# Patient Record
Sex: FEMALE | Race: WHITE | NOT HISPANIC OR LATINO | Employment: FULL TIME | URBAN - METROPOLITAN AREA
[De-identification: names, ages, dates, MRNs, and addresses within clinical notes are randomized per-mention and may not be internally consistent; named-entity substitution may affect disease eponyms.]

---

## 2017-01-17 ENCOUNTER — TRANSFERRED RECORDS (OUTPATIENT)
Dept: HEALTH INFORMATION MANAGEMENT | Facility: CLINIC | Age: 52
End: 2017-01-17

## 2018-12-16 ENCOUNTER — HOSPITAL ENCOUNTER (EMERGENCY)
Facility: HOSPITAL | Age: 53
Discharge: HOME | End: 2018-12-16
Attending: PHYSICIAN ASSISTANT | Admitting: PHYSICIAN ASSISTANT
Payer: COMMERCIAL

## 2018-12-16 ENCOUNTER — APPOINTMENT (OUTPATIENT)
Dept: CT IMAGING | Facility: HOSPITAL | Age: 53
End: 2018-12-16
Attending: PHYSICIAN ASSISTANT
Payer: COMMERCIAL

## 2018-12-16 VITALS
DIASTOLIC BLOOD PRESSURE: 75 MMHG | HEIGHT: 66 IN | RESPIRATION RATE: 16 BRPM | TEMPERATURE: 98.2 F | SYSTOLIC BLOOD PRESSURE: 112 MMHG | OXYGEN SATURATION: 98 %

## 2018-12-16 DIAGNOSIS — N20.1 CALCULUS OF URETER: ICD-10-CM

## 2018-12-16 LAB
ALBUMIN SERPL-MCNC: 4 G/DL (ref 3.4–5)
ALBUMIN UR-MCNC: NEGATIVE MG/DL
ALP SERPL-CCNC: 44 U/L (ref 40–150)
ALT SERPL W P-5'-P-CCNC: 27 U/L (ref 0–50)
ANION GAP SERPL CALCULATED.3IONS-SCNC: 5 MMOL/L (ref 3–14)
APPEARANCE UR: CLEAR
AST SERPL W P-5'-P-CCNC: 11 U/L (ref 0–45)
BACTERIA #/AREA URNS HPF: ABNORMAL /HPF
BASOPHILS # BLD AUTO: 0 10E9/L (ref 0–0.2)
BASOPHILS NFR BLD AUTO: 0.3 %
BILIRUB SERPL-MCNC: 0.4 MG/DL (ref 0.2–1.3)
BILIRUB UR QL STRIP: NEGATIVE
BUN SERPL-MCNC: 14 MG/DL (ref 7–30)
CALCIUM SERPL-MCNC: 8.7 MG/DL (ref 8.5–10.1)
CHLORIDE SERPL-SCNC: 105 MMOL/L (ref 94–109)
CO2 SERPL-SCNC: 28 MMOL/L (ref 20–32)
COLOR UR AUTO: ABNORMAL
CREAT SERPL-MCNC: 0.61 MG/DL (ref 0.52–1.04)
CRP SERPL-MCNC: <2.9 MG/L (ref 0–8)
DIFFERENTIAL METHOD BLD: NORMAL
EOSINOPHIL # BLD AUTO: 0.1 10E9/L (ref 0–0.7)
EOSINOPHIL NFR BLD AUTO: 0.7 %
ERYTHROCYTE [DISTWIDTH] IN BLOOD BY AUTOMATED COUNT: 11.9 % (ref 10–15)
GFR SERPL CREATININE-BSD FRML MDRD: >90 ML/MIN/1.7M2
GLUCOSE SERPL-MCNC: 93 MG/DL (ref 70–99)
GLUCOSE UR STRIP-MCNC: NEGATIVE MG/DL
HCT VFR BLD AUTO: 41.8 % (ref 35–47)
HGB BLD-MCNC: 14.3 G/DL (ref 11.7–15.7)
HGB UR QL STRIP: ABNORMAL
IMM GRANULOCYTES # BLD: 0 10E9/L (ref 0–0.4)
IMM GRANULOCYTES NFR BLD: 0.3 %
KETONES UR STRIP-MCNC: NEGATIVE MG/DL
LEUKOCYTE ESTERASE UR QL STRIP: NEGATIVE
LYMPHOCYTES # BLD AUTO: 1.3 10E9/L (ref 0.8–5.3)
LYMPHOCYTES NFR BLD AUTO: 18.5 %
MCH RBC QN AUTO: 29.8 PG (ref 26.5–33)
MCHC RBC AUTO-ENTMCNC: 34.2 G/DL (ref 31.5–36.5)
MCV RBC AUTO: 87 FL (ref 78–100)
MONOCYTES # BLD AUTO: 0.4 10E9/L (ref 0–1.3)
MONOCYTES NFR BLD AUTO: 6 %
MUCOUS THREADS #/AREA URNS LPF: PRESENT /LPF
NEUTROPHILS # BLD AUTO: 5.1 10E9/L (ref 1.6–8.3)
NEUTROPHILS NFR BLD AUTO: 74.2 %
NITRATE UR QL: NEGATIVE
NRBC # BLD AUTO: 0 10*3/UL
NRBC BLD AUTO-RTO: 0 /100
PH UR STRIP: 7.5 PH (ref 4.7–8)
PLATELET # BLD AUTO: 286 10E9/L (ref 150–450)
POTASSIUM SERPL-SCNC: 4 MMOL/L (ref 3.4–5.3)
PROT SERPL-MCNC: 7.7 G/DL (ref 6.8–8.8)
RBC # BLD AUTO: 4.8 10E12/L (ref 3.8–5.2)
RBC #/AREA URNS AUTO: >182 /HPF (ref 0–2)
SODIUM SERPL-SCNC: 138 MMOL/L (ref 133–144)
SOURCE: ABNORMAL
SP GR UR STRIP: 1 (ref 1–1.03)
UROBILINOGEN UR STRIP-MCNC: NORMAL MG/DL (ref 0–2)
WBC # BLD AUTO: 6.9 10E9/L (ref 4–11)
WBC #/AREA URNS AUTO: 2 /HPF (ref 0–5)

## 2018-12-16 PROCEDURE — 96375 TX/PRO/DX INJ NEW DRUG ADDON: CPT

## 2018-12-16 PROCEDURE — 99284 EMERGENCY DEPT VISIT MOD MDM: CPT | Performed by: PHYSICIAN ASSISTANT

## 2018-12-16 PROCEDURE — 85025 COMPLETE CBC W/AUTO DIFF WBC: CPT | Performed by: PHYSICIAN ASSISTANT

## 2018-12-16 PROCEDURE — 80053 COMPREHEN METABOLIC PANEL: CPT | Performed by: PHYSICIAN ASSISTANT

## 2018-12-16 PROCEDURE — 86140 C-REACTIVE PROTEIN: CPT | Performed by: PHYSICIAN ASSISTANT

## 2018-12-16 PROCEDURE — 36415 COLL VENOUS BLD VENIPUNCTURE: CPT | Performed by: PHYSICIAN ASSISTANT

## 2018-12-16 PROCEDURE — 74177 CT ABD & PELVIS W/CONTRAST: CPT | Mod: TC

## 2018-12-16 PROCEDURE — 25000128 H RX IP 250 OP 636: Performed by: PHYSICIAN ASSISTANT

## 2018-12-16 PROCEDURE — 96374 THER/PROPH/DIAG INJ IV PUSH: CPT | Mod: XU

## 2018-12-16 PROCEDURE — 99285 EMERGENCY DEPT VISIT HI MDM: CPT | Mod: 25

## 2018-12-16 PROCEDURE — 81001 URINALYSIS AUTO W/SCOPE: CPT | Performed by: PHYSICIAN ASSISTANT

## 2018-12-16 PROCEDURE — 25500064 ZZH RX 255 OP 636: Performed by: PHYSICIAN ASSISTANT

## 2018-12-16 RX ORDER — DIPHENHYDRAMINE HYDROCHLORIDE 50 MG/ML
50 INJECTION INTRAMUSCULAR; INTRAVENOUS ONCE
Status: COMPLETED | OUTPATIENT
Start: 2018-12-16 | End: 2018-12-16

## 2018-12-16 RX ORDER — LEVOTHYROXINE SODIUM 112 UG/1
112 TABLET ORAL DAILY
COMMUNITY
End: 2023-12-20

## 2018-12-16 RX ORDER — IOPAMIDOL 612 MG/ML
100 INJECTION, SOLUTION INTRAVASCULAR ONCE
Status: COMPLETED | OUTPATIENT
Start: 2018-12-16 | End: 2018-12-16

## 2018-12-16 RX ORDER — CHLORAL HYDRATE 500 MG
2 CAPSULE ORAL DAILY
COMMUNITY
End: 2023-02-21

## 2018-12-16 RX ORDER — MORPHINE SULFATE 2 MG/ML
2 INJECTION, SOLUTION INTRAMUSCULAR; INTRAVENOUS ONCE
Status: COMPLETED | OUTPATIENT
Start: 2018-12-16 | End: 2018-12-16

## 2018-12-16 RX ORDER — ONDANSETRON 2 MG/ML
4 INJECTION INTRAMUSCULAR; INTRAVENOUS ONCE
Status: COMPLETED | OUTPATIENT
Start: 2018-12-16 | End: 2018-12-16

## 2018-12-16 RX ORDER — METHYLPREDNISOLONE SODIUM SUCCINATE 125 MG/2ML
125 INJECTION, POWDER, LYOPHILIZED, FOR SOLUTION INTRAMUSCULAR; INTRAVENOUS ONCE
Status: COMPLETED | OUTPATIENT
Start: 2018-12-16 | End: 2018-12-16

## 2018-12-16 RX ADMIN — ONDANSETRON 4 MG: 2 INJECTION INTRAMUSCULAR; INTRAVENOUS at 12:48

## 2018-12-16 RX ADMIN — METHYLPREDNISOLONE SODIUM SUCCINATE 125 MG: 125 INJECTION, POWDER, FOR SOLUTION INTRAMUSCULAR; INTRAVENOUS at 13:13

## 2018-12-16 RX ADMIN — DIPHENHYDRAMINE HYDROCHLORIDE 50 MG: 50 INJECTION, SOLUTION INTRAMUSCULAR; INTRAVENOUS at 13:57

## 2018-12-16 RX ADMIN — MORPHINE SULFATE 2 MG: 2 INJECTION, SOLUTION INTRAMUSCULAR; INTRAVENOUS at 12:49

## 2018-12-16 RX ADMIN — IOPAMIDOL 100 ML: 612 INJECTION, SOLUTION INTRAVENOUS at 14:07

## 2018-12-16 RX ADMIN — SODIUM CHLORIDE 1000 ML: 9 INJECTION, SOLUTION INTRAVENOUS at 12:47

## 2018-12-16 ASSESSMENT — ENCOUNTER SYMPTOMS
ANAL BLEEDING: 0
VOMITING: 0
NEUROLOGICAL NEGATIVE: 1
HEMATURIA: 0
FREQUENCY: 0
FLANK PAIN: 0
BLOOD IN STOOL: 0
SHORTNESS OF BREATH: 0
UNEXPECTED WEIGHT CHANGE: 0
DIARRHEA: 0
ABDOMINAL DISTENTION: 0
DYSURIA: 0
CHILLS: 0
CONSTIPATION: 0
FEVER: 0
ABDOMINAL PAIN: 1
SORE THROAT: 0
APPETITE CHANGE: 1
MUSCULOSKELETAL NEGATIVE: 1
DIFFICULTY URINATING: 0
NAUSEA: 0

## 2018-12-16 NOTE — ED NOTES
Last meal last night 2000, water sip 1100. Cramping abd pain since Wednesday even after BM. Pain moved more to RLQ at about 0300, good BM today with no change in pain, denies dyuria. Very slight nausea no vomitting.

## 2018-12-16 NOTE — ED TRIAGE NOTES
Pt comes into the ER with right lower abdominal pain that kept her up last night. Lower abdominal pain with cramping since Wednesday. Multiple BM. No vomiting, nausea.   Chills.

## 2018-12-16 NOTE — ED AVS SNAPSHOT
[facility]  [address]  [place] [address]  Phone:  [phone]                                    Gisel Jameson   MRN: [id number]    Department:  [facility]   Date of Visit:  12/16/2018           After Visit Summary Signature Page    I have received my discharge instructions, and my questions have been answered. I have discussed any challenges I see with this plan with the nurse or doctor.    ..........................................................................................................................................  Patient/Patient Representative Signature      ..........................................................................................................................................  Patient Representative Print Name and Relationship to Patient    ..................................................               ................................................  Date                                   Time    ..........................................................................................................................................  Reviewed by Signature/Title    ...................................................              ..............................................  Date                                               Time          22EPIC Rev 08/18

## 2018-12-16 NOTE — DISCHARGE INSTRUCTIONS
Increase fluids. Take Ibuprofen 800mg every 6-8 hours for pain. Return here with any new or worsening symptoms.     What to expect when you have contrast    During your exam, we will inject ?contrast? into your vein or artery. (Contrast is a clear liquid with iodine in it. It shows up on X-rays.)    You may feel warm or hot. You may have a metal taste in your mouth and a slight upset stomach. You may also feel pressure near the kidneys and bladder. These effects will last about 1 to 3 minutes.    Please tell us if you have:   Sneezing    Itching   Hives    Swelling in the face   A hoarse voice   Breathing problems   Other new symptoms    Serious problems are rare.  They may include:   Irregular heartbeat    Seizures   Kidney failure             Tissue damage   Shock     Death    If you have any problems during the exam, we  will treat them right away.    When you get home    Call your hospital if you have any new symptoms in the next 2 days, like hives or swelling. (Phone numbers are at the bottom of this page.) Or call your family doctor.     If you have wheezing or trouble breathing, call 911.    Self-care  -Drink at least 4 extra glasses of water today.   This reduces the stress on your kidneys.  -Keep taking your regular medicines.    The contrast will pass out of your body in your  Urine(pee). This will happen in the next 24 hours. You  will not feel this. Your urine will not  change color.    If you have kidney problems or take metformin    Drink 4 to 8 large glasses of water for the next  2 days, if you are not on a fluid restriction.    ?If you take metformin (Glucophage or Glucovance) for diabetes, keep taking it.      ?Your kidney function tests are abnormal.  If you take Metformin, do not take it for 48 hours. Please go to your clinic for a blood test within 3 days after your exam before the restarting this medicine.     (Note to provider:please give patient prescription for lab tests.)    ?Special  instructions: 4 to 8 extra glasses of water for the next 2 days    I have read and understand the above information.    Patient Sign Here:______________________________________Date:________Time:______    Staff Sign Here:________________________________________Date:_______Time:______      Radiology Departments:     ?[facility]: [phone] ?[facility]: [phone]     ?[facility]: [phone] ?[facility]:[phone]      ?[facility]: [phone]  ?[facility]: [phone]  ?[place]:[phone]    ?[facility]:[phone]  ?[facility]:[phone]

## 2018-12-17 NOTE — ED PROVIDER NOTES
"  History     Chief Complaint   Patient presents with     Abdominal Pain     HPI  Gisel Jameson is a 53 year old female who presents with RLQ abdominal pain intermittently x 4 days, worse since this am. Slightly decreased appetite today. Denies fevers/chills or n/v/d.     Problem List:    There are no active problems to display for this patient.       Past Medical History:    No past medical history on file.    Past Surgical History:    No past surgical history on file.    Family History:    No family history on file.    Social History:  Marital Status:   [2]  Social History     Tobacco Use     Smoking status: Not on file   Substance Use Topics     Alcohol use: Not on file     Drug use: Not on file        Medications:      fish oil-omega-3 fatty acids 1000 MG capsule   levothyroxine (SYNTHROID/LEVOTHROID) 112 MCG tablet   metFORMIN (GLUCOPHAGE) 500 MG tablet         Review of Systems   Constitutional: Positive for appetite change. Negative for chills, fever and unexpected weight change.   HENT: Negative for sore throat.    Respiratory: Negative for shortness of breath.    Cardiovascular: Negative for chest pain.   Gastrointestinal: Positive for abdominal pain. Negative for abdominal distention, anal bleeding, blood in stool, constipation, diarrhea, nausea and vomiting.   Genitourinary: Negative.  Negative for difficulty urinating, dyspareunia, dysuria, flank pain, frequency, genital sores, hematuria, pelvic pain, urgency, vaginal bleeding, vaginal discharge and vaginal pain.   Musculoskeletal: Negative.    Skin: Negative.    Neurological: Negative.    All other systems reviewed and are negative.      Physical Exam   BP: 128/81  Heart Rate: 69  Temp: 97.9  F (36.6  C)  Resp: 14  Height: 167.6 cm (5' 6\")  SpO2: 100 %      Physical Exam   Constitutional: She is oriented to person, place, and time. Vital signs are normal. She appears well-developed and well-nourished.  Non-toxic appearance. She does not have a " sickly appearance. She does not appear ill. No distress.   HENT:   Head: Normocephalic and atraumatic.   Right Ear: External ear normal.   Left Ear: External ear normal.   Nose: Nose normal.   Mouth/Throat: Oropharynx is clear and moist. No oropharyngeal exudate.   Eyes: Conjunctivae are normal. Pupils are equal, round, and reactive to light. Right eye exhibits no discharge. Left eye exhibits no discharge. No scleral icterus.   Neck: Normal range of motion. Neck supple.   Cardiovascular: Normal rate, regular rhythm, normal heart sounds and intact distal pulses. Exam reveals no gallop and no friction rub.   No murmur heard.  Pulmonary/Chest: Effort normal and breath sounds normal. No respiratory distress. She has no wheezes. She has no rales. She exhibits no tenderness.   Abdominal: Soft. Bowel sounds are normal. She exhibits no distension and no mass. There is tenderness in the right lower quadrant. There is no rebound and no guarding.   Musculoskeletal: Normal range of motion. She exhibits no edema.   Lymphadenopathy:     She has no cervical adenopathy.   Neurological: She is alert and oriented to person, place, and time. No cranial nerve deficit.   Skin: Skin is warm and dry. No rash noted. She is not diaphoretic. No erythema. No pallor.   Psychiatric: She has a normal mood and affect. Her behavior is normal. Judgment and thought content normal.   Nursing note and vitals reviewed.      ED Course        Procedures               Results for orders placed or performed during the hospital encounter of 12/16/18 (from the past 24 hour(s))   CBC with platelets differential   Result Value Ref [facility]    WBC 6.9 4.0 - 11.0 10e9/L    RBC Count 4.80 3.8 - 5.2 10e12/L    Hemoglobin 14.3 11.7 - 15.7 g/dL    Hematocrit 41.8 35.0 - 47.0 %    MCV 87 78 - 100 fl    MCH 29.8 26.5 - 33.0 pg    MCHC 34.2 31.5 - 36.5 g/dL    RDW 11.9 10.0 - 15.0 %    Platelet Count 286 150 - 450 10e9/L    Diff Method Automated Method     % Neutrophils  74.2 %    % Lymphocytes 18.5 %    % Monocytes 6.0 %    % Eosinophils 0.7 %    % Basophils 0.3 %    % Immature Granulocytes 0.3 %    Nucleated RBCs 0 0 /100    Absolute Neutrophil 5.1 1.6 - 8.3 10e9/L    Absolute Lymphocytes 1.3 0.8 - 5.3 10e9/L    Absolute Monocytes 0.4 0.0 - 1.3 10e9/L    Absolute Eosinophils 0.1 0.0 - 0.7 10e9/L    Absolute Basophils 0.0 0.0 - 0.2 10e9/L    Abs Immature Granulocytes 0.0 0 - 0.4 10e9/L    Absolute Nucleated RBC 0.0    Comprehensive metabolic panel   Result Value Ref [facility]    Sodium 138 133 - 144 mmol/L    Potassium 4.0 3.4 - 5.3 mmol/L    Chloride 105 94 - 109 mmol/L    Carbon Dioxide 28 20 - 32 mmol/L    Anion Gap 5 3 - 14 mmol/L    Glucose 93 70 - 99 mg/dL    Urea Nitrogen 14 7 - 30 mg/dL    Creatinine 0.61 0.52 - 1.04 mg/dL    GFR Estimate >90 >60 mL/min/1.7m2    GFR Estimate If Black >90 >60 mL/min/1.7m2    Calcium 8.7 8.5 - 10.1 mg/dL    Bilirubin Total 0.4 0.2 - 1.3 mg/dL    Albumin 4.0 3.4 - 5.0 g/dL    Protein Total 7.7 6.8 - 8.8 g/dL    Alkaline Phosphatase 44 40 - 150 U/L    ALT 27 0 - 50 U/L    AST 11 0 - 45 U/L   CRP inflammation   Result Value Ref [facility]    CRP Inflammation <2.9 0.0 - 8.0 mg/L   CT Abdomen Pelvis w Contrast    Narrative    PROCEDURE: CT ABDOMEN PELVIS W CONTRAST 12/16/2018 2:25 PM    HISTORY: Abd pain, diverticulitis suspected; RLQ abdominal pain,  appendicitis vs diverticulitis    COMPARISONS: None.    Meds/Dose Given: ISOVUE 300  100ML    TECHNIQUE: Axial postcontrast enhanced images with coronal and  sagittal reformatted images.    FINDINGS: Lung bases are clear.    No focal abnormality is seen in the liver, spleen, adrenal glands,  pancreas or in the right kidney.    There is a 2.7 x 1.6 cm fat-containing mass in the lateral aspect of  the left kidney. There is mild dilatation of the right renal pelvis.  There is a 6 mm stone in the right renal pelvis. No ureteral stone is  seen on the left.    There is no inflammatory change. No bowel distention is  seen. There is  no pelvic mass or fluid collection.    Bone windows show no fracture. There is no aneurysm of the abdominal  aorta. There is no bone lesion.    Multiple gallstones are seen.    Findings are concordant with the original virtual radiology result.         Impression    IMPRESSION:   1. Gallstones.  2. Fat-containing mass in the lateral left kidney, probably an  angiomyolipoma.  3. Mildly prominent right renal pelvis with 6 mm stone in the renal  pelvis. No ureteral stone is seen.    MICHELLE KELLY MD   UA reflex to Microscopic and Culture   Result Value Ref [facility]    Color Urine Light Yellow     Appearance Urine Clear     Glucose Urine Negative NEG^Negative mg/dL    Bilirubin Urine Negative NEG^Negative    Ketones Urine Negative NEG^Negative mg/dL    Specific Gravity Urine 1.005 1.003 - 1.035    Blood Urine Large (A) NEG^Negative    pH Urine 7.5 4.7 - 8.0 pH    Protein Albumin Urine Negative NEG^Negative mg/dL    Urobilinogen mg/dL Normal 0.0 - 2.0 mg/dL    Nitrite Urine Negative NEG^Negative    Leukocyte Esterase Urine Negative NEG^Negative    Source Midstream Urine     RBC Urine >182 (H) 0 - 2 /HPF    WBC Urine 2 0 - 5 /HPF    Bacteria Urine None (A) NEG^Negative /HPF    Mucous Urine Present (A) NEG^Negative /LPF       Medications   morphine (PF) injection 2 mg (2 mg Intravenous Given 12/16/18 1249)   ondansetron (ZOFRAN) injection 4 mg (4 mg Intravenous Given 12/16/18 1248)   0.9% sodium chloride BOLUS (0 mLs Intravenous Stopped 12/16/18 1533)   methylPREDNISolone sodium succinate (solu-MEDROL) injection 125 mg (125 mg Intravenous Given 12/16/18 1313)   diphenhydrAMINE (BENADRYL) injection 50 mg (50 mg Intravenous Given 12/16/18 1357)   iopamidol (ISOVUE-300) IV solution 61% 100 mL (100 mLs Intravenous Given 12/16/18 1407)   sodium chloride (PF) 0.9% PF flush 60 mL (60 mLs Intravenous Given 12/16/18 1406)       Assessments & Plan (with Medical Decision Making)   Findings consistent with passed  right ureteral stone per CT abd/pelvis. UA positive for RBCs, negative for infection. Pt is pain free. She was discharged home following.       I have reviewed the nursing notes.    I have reviewed the findings, diagnosis, plan and need for follow up with the patient.       Medication List      There are no discharge medications for this visit.         Final diagnoses:   Calculus of ureter - passed       12/16/2018   [facility]     Eliana Sin PA-C  12/16/18 [id number]

## 2019-09-20 ENCOUNTER — TRANSFERRED RECORDS (OUTPATIENT)
Dept: HEALTH INFORMATION MANAGEMENT | Facility: CLINIC | Age: 54
End: 2019-09-20

## 2021-08-12 LAB
ALT SERPL-CCNC: 31 U/L (ref 18–65)
AST SERPL-CCNC: 22 U/L (ref 10–30)
CHOLESTEROL (EXTERNAL): 271 MG/DL
CREATININE (EXTERNAL): 0.73 MG/DL (ref 0.7–1.2)
GFR ESTIMATED (EXTERNAL): >60 ML/MIN/1.73M2
GLUCOSE (EXTERNAL): 93 MG/DL (ref 60–99)
HDLC SERPL-MCNC: 52 MG/DL
LDL CHOLESTEROL CALCULATED (EXTERNAL): 182 MG/DL
POTASSIUM (EXTERNAL): 4.2 MMOL/L (ref 3.5–5.1)
TRIGLYCERIDES (EXTERNAL): 186 MG/DL
TSH SERPL-ACNC: 1.18 MIU/ML (ref 0.35–4.8)

## 2022-01-23 ENCOUNTER — TRANSFERRED RECORDS (OUTPATIENT)
Dept: HEALTH INFORMATION MANAGEMENT | Facility: CLINIC | Age: 57
End: 2022-01-23

## 2022-06-02 ENCOUNTER — TRANSFERRED RECORDS (OUTPATIENT)
Dept: HEALTH INFORMATION MANAGEMENT | Facility: CLINIC | Age: 57
End: 2022-06-02

## 2022-06-02 LAB
ALT SERPL-CCNC: 25 U/L (ref 18–65)
AST SERPL-CCNC: 21 U/L (ref 10–30)
CHOLESTEROL (EXTERNAL): 184 MG/DL
CREATININE (EXTERNAL): 0.6 MG/DL (ref 0.7–1.2)
GFR ESTIMATED (EXTERNAL): >60 ML/MIN/1.73M2
GLUCOSE (EXTERNAL): 80 MG/DL (ref 60–99)
HBA1C MFR BLD: 5.6 %
HDLC SERPL-MCNC: 41 MG/DL
LDL CHOLESTEROL CALCULATED (EXTERNAL): 105 MG/DL
PHQ9 SCORE: 2
POTASSIUM (EXTERNAL): 4.5 MMOL/L (ref 3.5–5.1)
TRIGLYCERIDES (EXTERNAL): 188 MG/DL
TSH SERPL-ACNC: 0.14 UIU/ML (ref 0.35–4.8)

## 2022-10-21 ENCOUNTER — TRANSFERRED RECORDS (OUTPATIENT)
Dept: HEALTH INFORMATION MANAGEMENT | Facility: CLINIC | Age: 57
End: 2022-10-21

## 2023-02-08 ENCOUNTER — TRANSFERRED RECORDS (OUTPATIENT)
Dept: HEALTH INFORMATION MANAGEMENT | Facility: CLINIC | Age: 58
End: 2023-02-08

## 2023-02-20 NOTE — PROGRESS NOTES
Assessment & Plan   Problem List Items Addressed This Visit    None  Visit Diagnoses     Routine history and physical examination of adult    -  Primary    Relevant Orders    CBC with platelets and differential (Completed)    Comprehensive metabolic panel (BMP + Alb, Alk Phos, ALT, AST, Total. Bili, TP)    Vitamin D deficiency        Relevant Orders    Vitamin D Deficiency    Hyperlipidemia LDL goal <100        Relevant Medications    rosuvastatin (CRESTOR) 10 MG tablet    Other Relevant Orders    Lipid Profile (Chol, Trig, HDL, LDL calc)    Acquired hypothyroidism        Relevant Orders    TSH    T4, free    Metabolic syndrome X        Relevant Orders    Hemoglobin A1c             40 minutes spent on the date of the encounter doing chart review, review of test results, interpretation of tests, patient visit and documentation            No follow-ups on file.    [facility], [facility]    Jordan Batres is a 57 year old, presenting for the following health issues:  Establish Care      NOHEMI Batres is a 57 year old female with a history of hypothyroidism, metabolic syndrome and hyperlipidemia who presents today to establish care.  She has no specific complaints today but does report ongoing constipation with her Rybelsus and states it has not worked for weight loss, uncertain if it benefits her non alcoholic fatty liver disease.    Previous PCP- Dr.Mark Cerda     Metabolic syndrome       How often are you checking your blood sugar? Not at all    What concerns do you have today about your diabetes? None and Other: discuss rybelsus     Do you have any of these symptoms? (Select all that apply)  No numbness or tingling in feet.  No redness, sores or blisters on feet.  No complaints of excessive thirst.  No reports of blurry vision.  No significant changes to weight.     Medication:    rybelsus 14 mg daily         BP Readings from Last 2 Encounters:   02/21/23 113/74    12/16/18 112/75     No results found for: A1C, LDL      Hypothyroidism       Since last visit, patient describes the following symptoms: Weight stable, no hair loss, no skin changes, no constipation, no loose stools and constipation        Review of Systems   Constitutional, HEENT, cardiovascular, pulmonary, GI, , musculoskeletal, neuro, skin, endocrine and psych systems are negative, except as otherwise noted.      Objective    /74 (BP Location: Left arm, Patient Position: Sitting, Cuff Size: Adult Regular)   Pulse 70   Temp 97.8  F (36.6  C) (Tympanic)   Resp 20   Wt 72.8 kg (160 lb 9.6 oz)   SpO2 98%   BMI 25.92 kg/m    Body mass index is 25.92 kg/m .  Physical Exam   GENERAL: healthy, alert and no distress  EYES: Eyes grossly normal to inspection, PERRL and conjunctivae and sclerae normal  HENT: ear canals and TM's normal, nose and mouth without ulcers or lesions  NECK: no adenopathy, no asymmetry, masses, or scars and thyroid normal to palpation  RESP: lungs clear to auscultation - no rales, rhonchi or wheezes  BREAST: normal without masses, tenderness or nipple discharge and no palpable axillary masses or adenopathy  CV: regular rate and rhythm, normal S1 S2, no S3 or S4, no murmur, click or rub, no peripheral edema and peripheral pulses strong  ABDOMEN: soft, nontender, no hepatosplenomegaly, no masses and bowel sounds normal  MS: no gross musculoskeletal defects noted, no edema  SKIN: no suspicious lesions or rashes  NEURO: Normal strength and tone, mentation intact and speech normal  PSYCH: mentation appears normal, affect normal/bright    Admission on 12/16/2018, Discharged on 12/16/2018   Component Date Value Ref [place] Status     WBC 12/16/2018 6.9  4.0 - 11.0 10e9/L Final     RBC Count 12/16/2018 4.80  3.8 - 5.2 10e12/L Final     Hemoglobin 12/16/2018 14.3  11.7 - 15.7 g/dL Final     Hematocrit 12/16/2018 41.8  35.0 - 47.0 % Final     MCV 12/16/2018 87  78 - 100 fl Final     MCH  12/16/2018 29.8  26.5 - 33.0 pg Final     MCHC 12/16/2018 34.2  31.5 - 36.5 g/dL Final     RDW 12/16/2018 11.9  10.0 - 15.0 % Final     Platelet Count 12/16/2018 286  150 - 450 10e9/L Final     Diff Method 12/16/2018 Automated Method   Final     % Neutrophils 12/16/2018 74.2  % Final     % Lymphocytes 12/16/2018 18.5  % Final     % Monocytes 12/16/2018 6.0  % Final     % Eosinophils 12/16/2018 0.7  % Final     % Basophils 12/16/2018 0.3  % Final     % Immature Granulocytes 12/16/2018 0.3  % Final     Nucleated RBCs 12/16/2018 0  0 /100 Final     Absolute Neutrophil 12/16/2018 5.1  1.6 - 8.3 10e9/L Final     Absolute Lymphocytes 12/16/2018 1.3  0.8 - 5.3 10e9/L Final     Absolute Monocytes 12/16/2018 0.4  0.0 - 1.3 10e9/L Final     Absolute Eosinophils 12/16/2018 0.1  0.0 - 0.7 10e9/L Final     Absolute Basophils 12/16/2018 0.0  0.0 - 0.2 10e9/L Final     Abs Immature Granulocytes 12/16/2018 0.0  0 - 0.4 10e9/L Final     Absolute Nucleated RBC 12/16/2018 0.0   Final     Sodium 12/16/2018 138  133 - 144 mmol/L Final     Potassium 12/16/2018 4.0  3.4 - 5.3 mmol/L Final     Chloride 12/16/2018 105  94 - 109 mmol/L Final     Carbon Dioxide 12/16/2018 28  20 - 32 mmol/L Final     Anion Gap 12/16/2018 5  3 - 14 mmol/L Final     Glucose 12/16/2018 93  70 - 99 mg/dL Final     Urea Nitrogen 12/16/2018 14  7 - 30 mg/dL Final     Creatinine 12/16/2018 0.61  0.52 - 1.04 mg/dL Final     GFR Estimate 12/16/2018 >90  >60 mL/min/1.7m2 Final    Non  GFR Calc     GFR Estimate If Black 12/16/2018 >90  >60 mL/min/1.7m2 Final    African American GFR Calc     Calcium 12/16/2018 8.7  8.5 - 10.1 mg/dL Final     Bilirubin Total 12/16/2018 0.4  0.2 - 1.3 mg/dL Final     Albumin 12/16/2018 4.0  3.4 - 5.0 g/dL Final     Protein Total 12/16/2018 7.7  6.8 - 8.8 g/dL Final     Alkaline Phosphatase 12/16/2018 44  40 - 150 U/L Final     ALT 12/16/2018 27  0 - 50 U/L Final     AST 12/16/2018 11  0 - 45 U/L Final     CRP Inflammation  12/16/2018 <2.9  0.0 - 8.0 mg/L Final     Color Urine 12/16/2018 Light Yellow   Final     Appearance Urine 12/16/2018 Clear   Final     Glucose Urine 12/16/2018 Negative  NEG^Negative mg/dL Final     Bilirubin Urine 12/16/2018 Negative  NEG^Negative Final     Ketones Urine 12/16/2018 Negative  NEG^Negative mg/dL Final     Specific Gravity Urine 12/16/2018 1.005  1.003 - 1.035 Final     Blood Urine 12/16/2018 Large (A)  NEG^Negative Final     pH Urine 12/16/2018 7.5  4.7 - 8.0 pH Final     Protein Albumin Urine 12/16/2018 Negative  NEG^Negative mg/dL Final     Urobilinogen mg/dL 12/16/2018 Normal  0.0 - 2.0 mg/dL Final     Nitrite Urine 12/16/2018 Negative  NEG^Negative Final     Leukocyte Esterase Urine 12/16/2018 Negative  NEG^Negative Final     Source 12/16/2018 Midstream Urine   Final     RBC Urine 12/16/2018 >182 (H)  0 - 2 /HPF Final     WBC Urine 12/16/2018 2  0 - 5 /HPF Final     Bacteria Urine 12/16/2018 None (A)  NEG^Negative /HPF Final     Mucous Urine 12/16/2018 Present (A)  NEG^Negative /LPF Final     Results for orders placed or performed in visit on 02/21/23   CBC with platelets and differential     Status: None   Result Value Ref [facility]    WBC Count 6.0 4.0 - 11.0 10e3/uL    RBC Count 4.48 3.80 - 5.20 10e6/uL    Hemoglobin 13.1 11.7 - 15.7 g/dL    Hematocrit 39.8 35.0 - 47.0 %    MCV 89 78 - 100 fL    MCH 29.2 26.5 - 33.0 pg    MCHC 32.9 31.5 - 36.5 g/dL    RDW 12.4 10.0 - 15.0 %    Platelet Count 322 150 - 450 10e3/uL    % Neutrophils 65 %    % Lymphocytes 26 %    % Monocytes 8 %    % Eosinophils 1 %    % Basophils 0 %    Absolute Neutrophils 3.9 1.6 - 8.3 10e3/uL    Absolute Lymphocytes 1.6 0.8 - 5.3 10e3/uL    Absolute Monocytes 0.5 0.0 - 1.3 10e3/uL    Absolute Eosinophils 0.1 0.0 - 0.7 10e3/uL    Absolute Basophils 0.0 0.0 - 0.2 10e3/uL   CBC with platelets and differential     Status: None    Narrative    The following orders were created for panel order CBC with platelets and  differential.  Procedure                               Abnormality         Status                     ---------                               -----------         ------                     CBC with platelets and d...[[id number]]                      Final result                 Please view results for these tests on the individual orders.     Results for orders placed or performed in visit on 02/21/23 (from the past 24 hour(s))   CBC with platelets and differential    Narrative    The following orders were created for panel order CBC with platelets and differential.  Procedure                               Abnormality         Status                     ---------                               -----------         ------                     CBC with platelets and d...[[id number]]                      Final result                 Please view results for these tests on the individual orders.   CBC with platelets and differential   Result Value Ref [facility]    WBC Count 6.0 4.0 - 11.0 10e3/uL    RBC Count 4.48 3.80 - 5.20 10e6/uL    Hemoglobin 13.1 11.7 - 15.7 g/dL    Hematocrit 39.8 35.0 - 47.0 %    MCV 89 78 - 100 fL    MCH 29.2 26.5 - 33.0 pg    MCHC 32.9 31.5 - 36.5 g/dL    RDW 12.4 10.0 - 15.0 %    Platelet Count 322 150 - 450 10e3/uL    % Neutrophils 65 %    % Lymphocytes 26 %    % Monocytes 8 %    % Eosinophils 1 %    % Basophils 0 %    Absolute Neutrophils 3.9 1.6 - 8.3 10e3/uL    Absolute Lymphocytes 1.6 0.8 - 5.3 10e3/uL    Absolute Monocytes 0.5 0.0 - 1.3 10e3/uL    Absolute Eosinophils 0.1 0.0 - 0.7 10e3/uL    Absolute Basophils 0.0 0.0 - 0.2 10e3/uL

## 2023-02-21 ENCOUNTER — OFFICE VISIT (OUTPATIENT)
Dept: INTERNAL MEDICINE | Facility: OTHER | Age: 58
End: 2023-02-21
Attending: INTERNAL MEDICINE
Payer: COMMERCIAL

## 2023-02-21 VITALS
RESPIRATION RATE: 20 BRPM | OXYGEN SATURATION: 98 % | DIASTOLIC BLOOD PRESSURE: 74 MMHG | WEIGHT: 160.6 LBS | HEART RATE: 70 BPM | SYSTOLIC BLOOD PRESSURE: 113 MMHG | BODY MASS INDEX: 25.92 KG/M2 | TEMPERATURE: 97.8 F

## 2023-02-21 DIAGNOSIS — E55.9 VITAMIN D DEFICIENCY: ICD-10-CM

## 2023-02-21 DIAGNOSIS — E88.810 METABOLIC SYNDROME X: ICD-10-CM

## 2023-02-21 DIAGNOSIS — E78.5 HYPERLIPIDEMIA LDL GOAL <100: ICD-10-CM

## 2023-02-21 DIAGNOSIS — Z00.00 ROUTINE HISTORY AND PHYSICAL EXAMINATION OF ADULT: Primary | ICD-10-CM

## 2023-02-21 DIAGNOSIS — E03.9 ACQUIRED HYPOTHYROIDISM: ICD-10-CM

## 2023-02-21 LAB
ALBUMIN SERPL BCG-MCNC: 4.4 G/DL (ref 3.5–5.2)
ALP SERPL-CCNC: 46 U/L (ref 35–104)
ALT SERPL W P-5'-P-CCNC: 38 U/L (ref 10–35)
ANION GAP SERPL CALCULATED.3IONS-SCNC: 11 MMOL/L (ref 7–15)
AST SERPL W P-5'-P-CCNC: 23 U/L (ref 10–35)
BASOPHILS # BLD AUTO: 0 10E3/UL (ref 0–0.2)
BASOPHILS NFR BLD AUTO: 0 %
BILIRUB SERPL-MCNC: 0.3 MG/DL
BUN SERPL-MCNC: 21.2 MG/DL (ref 6–20)
CALCIUM SERPL-MCNC: 9.9 MG/DL (ref 8.6–10)
CHLORIDE SERPL-SCNC: 103 MMOL/L (ref 98–107)
CHOLEST SERPL-MCNC: 166 MG/DL
CREAT SERPL-MCNC: 0.68 MG/DL (ref 0.51–0.95)
DEPRECATED HCO3 PLAS-SCNC: 26 MMOL/L (ref 22–29)
EOSINOPHIL # BLD AUTO: 0.1 10E3/UL (ref 0–0.7)
EOSINOPHIL NFR BLD AUTO: 1 %
ERYTHROCYTE [DISTWIDTH] IN BLOOD BY AUTOMATED COUNT: 12.4 % (ref 10–15)
EST. AVERAGE GLUCOSE BLD GHB EST-MCNC: 126 MG/DL
GFR SERPL CREATININE-BSD FRML MDRD: >90 ML/MIN/1.73M2
GLUCOSE SERPL-MCNC: 96 MG/DL (ref 70–99)
HBA1C MFR BLD: 6 %
HCT VFR BLD AUTO: 39.8 % (ref 35–47)
HDLC SERPL-MCNC: 49 MG/DL
HGB BLD-MCNC: 13.1 G/DL (ref 11.7–15.7)
LDLC SERPL CALC-MCNC: 85 MG/DL
LYMPHOCYTES # BLD AUTO: 1.6 10E3/UL (ref 0.8–5.3)
LYMPHOCYTES NFR BLD AUTO: 26 %
MCH RBC QN AUTO: 29.2 PG (ref 26.5–33)
MCHC RBC AUTO-ENTMCNC: 32.9 G/DL (ref 31.5–36.5)
MCV RBC AUTO: 89 FL (ref 78–100)
MONOCYTES # BLD AUTO: 0.5 10E3/UL (ref 0–1.3)
MONOCYTES NFR BLD AUTO: 8 %
NEUTROPHILS # BLD AUTO: 3.9 10E3/UL (ref 1.6–8.3)
NEUTROPHILS NFR BLD AUTO: 65 %
NONHDLC SERPL-MCNC: 117 MG/DL
PLATELET # BLD AUTO: 322 10E3/UL (ref 150–450)
POTASSIUM SERPL-SCNC: 4.3 MMOL/L (ref 3.4–5.3)
PROT SERPL-MCNC: 7.4 G/DL (ref 6.4–8.3)
RBC # BLD AUTO: 4.48 10E6/UL (ref 3.8–5.2)
SODIUM SERPL-SCNC: 140 MMOL/L (ref 136–145)
T4 FREE SERPL-MCNC: 1.52 NG/DL (ref 0.9–1.7)
TRIGL SERPL-MCNC: 159 MG/DL
TSH SERPL DL<=0.005 MIU/L-ACNC: 0.11 UIU/ML (ref 0.3–4.2)
WBC # BLD AUTO: 6 10E3/UL (ref 4–11)

## 2023-02-21 PROCEDURE — 83036 HEMOGLOBIN GLYCOSYLATED A1C: CPT | Performed by: INTERNAL MEDICINE

## 2023-02-21 PROCEDURE — 84439 ASSAY OF FREE THYROXINE: CPT | Performed by: INTERNAL MEDICINE

## 2023-02-21 PROCEDURE — 82306 VITAMIN D 25 HYDROXY: CPT | Performed by: INTERNAL MEDICINE

## 2023-02-21 PROCEDURE — 80050 GENERAL HEALTH PANEL: CPT | Performed by: INTERNAL MEDICINE

## 2023-02-21 PROCEDURE — 99204 OFFICE O/P NEW MOD 45 MIN: CPT | Performed by: INTERNAL MEDICINE

## 2023-02-21 PROCEDURE — 36415 COLL VENOUS BLD VENIPUNCTURE: CPT | Performed by: INTERNAL MEDICINE

## 2023-02-21 PROCEDURE — 80061 LIPID PANEL: CPT | Performed by: INTERNAL MEDICINE

## 2023-02-21 RX ORDER — ROSUVASTATIN CALCIUM 10 MG/1
10 TABLET, COATED ORAL DAILY
COMMUNITY
End: 2023-08-10

## 2023-02-21 RX ORDER — METFORMIN HCL 500 MG
500 TABLET, EXTENDED RELEASE 24 HR ORAL
COMMUNITY
End: 2023-02-21

## 2023-02-21 RX ORDER — SENNOSIDES A AND B 8.6 MG/1
1 TABLET, FILM COATED ORAL 2 TIMES DAILY
COMMUNITY
End: 2023-12-20

## 2023-02-21 ASSESSMENT — PAIN SCALES - GENERAL: PAINLEVEL: MILD PAIN (2)

## 2023-02-22 LAB — DEPRECATED CALCIDIOL+CALCIFEROL SERPL-MC: 74 UG/L (ref 20–75)

## 2023-02-23 DIAGNOSIS — R73.03 PRE-DIABETES: Primary | ICD-10-CM

## 2023-02-23 RX ORDER — ORAL SEMAGLUTIDE 14 MG/1
14 TABLET ORAL DAILY
Qty: 90 TABLET | Refills: 1 | Status: SHIPPED | OUTPATIENT
Start: 2023-02-23 | End: 2023-06-21

## 2023-03-22 ENCOUNTER — OFFICE VISIT (OUTPATIENT)
Dept: FAMILY MEDICINE | Facility: OTHER | Age: 58
End: 2023-03-22
Attending: NURSE PRACTITIONER
Payer: COMMERCIAL

## 2023-03-22 VITALS
HEART RATE: 80 BPM | DIASTOLIC BLOOD PRESSURE: 76 MMHG | BODY MASS INDEX: 25.02 KG/M2 | OXYGEN SATURATION: 99 % | RESPIRATION RATE: 12 BRPM | WEIGHT: 155 LBS | TEMPERATURE: 97.5 F | SYSTOLIC BLOOD PRESSURE: 128 MMHG

## 2023-03-22 DIAGNOSIS — R30.0 DYSURIA: Primary | ICD-10-CM

## 2023-03-22 DIAGNOSIS — N30.00 ACUTE CYSTITIS WITHOUT HEMATURIA: ICD-10-CM

## 2023-03-22 LAB
ALBUMIN UR-MCNC: NEGATIVE MG/DL
APPEARANCE UR: CLEAR
BACTERIA #/AREA URNS HPF: ABNORMAL /HPF
BILIRUB UR QL STRIP: NEGATIVE
CLUE CELLS: NORMAL
COLOR UR AUTO: YELLOW
GLUCOSE UR STRIP-MCNC: NEGATIVE MG/DL
HGB UR QL STRIP: ABNORMAL
KETONES UR STRIP-MCNC: NEGATIVE MG/DL
LEUKOCYTE ESTERASE UR QL STRIP: ABNORMAL
MUCOUS THREADS #/AREA URNS LPF: PRESENT /LPF
NITRATE UR QL: NEGATIVE
PH UR STRIP: 6 [PH] (ref 5–7)
RBC #/AREA URNS AUTO: ABNORMAL /HPF
SP GR UR STRIP: 1.02 (ref 1–1.03)
SQUAMOUS #/AREA URNS AUTO: ABNORMAL /LPF
TRICHOMONAS, WET PREP: NORMAL
UROBILINOGEN UR STRIP-ACNC: 0.2 E.U./DL
WBC #/AREA URNS AUTO: ABNORMAL /HPF
WBC'S/HIGH POWER FIELD, WET PREP: NORMAL
YEAST, WET PREP: NORMAL

## 2023-03-22 PROCEDURE — 99203 OFFICE O/P NEW LOW 30 MIN: CPT | Performed by: NURSE PRACTITIONER

## 2023-03-22 PROCEDURE — 87086 URINE CULTURE/COLONY COUNT: CPT | Performed by: NURSE PRACTITIONER

## 2023-03-22 PROCEDURE — 81001 URINALYSIS AUTO W/SCOPE: CPT | Performed by: NURSE PRACTITIONER

## 2023-03-22 PROCEDURE — 87210 SMEAR WET MOUNT SALINE/INK: CPT | Performed by: NURSE PRACTITIONER

## 2023-03-22 RX ORDER — CIPROFLOXACIN 500 MG/1
500 TABLET, FILM COATED ORAL 2 TIMES DAILY
Qty: 14 TABLET | Refills: 0 | Status: SHIPPED | OUTPATIENT
Start: 2023-03-22 | End: 2023-03-29

## 2023-03-22 ASSESSMENT — PAIN SCALES - GENERAL: PAINLEVEL: NO PAIN (0)

## 2023-03-22 NOTE — PATIENT INSTRUCTIONS
Assessment & Plan          Dysuria  - Wet prep  - UA reflex to Microscopic and Culture - [facility]  - UA Microscopic with Reflex to Culture        Acute cystitis without hematuria  - ciprofloxacin (CIPRO) 500 MG tablet; Take 1 tablet (500 mg) by mouth 2 times daily for 7 days        Monitor for fever  Increase fluid intake  Follow-up if unimproved          Results for orders placed or performed in visit on 03/22/23   *UA reflex to Microscopic and Culture - [facility]     Status: Abnormal    Specimen: Urine, Midstream   Result Value Ref [facility]    Color Urine Yellow Colorless, Straw, Light Yellow, Yellow    Appearance Urine Clear Clear    Glucose Urine Negative Negative mg/dL    Bilirubin Urine Negative Negative    Ketones Urine Negative Negative mg/dL    Specific Gravity Urine 1.025 1.003 - 1.035    Blood Urine Trace (A) Negative    pH Urine 6.0 5.0 - 7.0    Protein Albumin Urine Negative Negative mg/dL    Urobilinogen Urine 0.2 0.2, 1.0 E.U./dL    Nitrite Urine Negative Negative    Leukocyte Esterase Urine Small (A) Negative   UA Microscopic with Reflex to Culture     Status: Abnormal   Result Value Ref [facility]    Bacteria Urine Few (A) None Seen /HPF    RBC Urine 2-5 (A) 0-2 /HPF /HPF    WBC Urine 5-10 (A) 0-5 /HPF /HPF    Squamous Epithelials Urine Few (A) None Seen /LPF    Mucus Urine Present (A) None Seen /LPF    Narrative    Urine Culture not indicated   Wet prep     Status: Normal    Specimen: Vagina; Swab   Result Value Ref [facility]    Trichomonas Absent Absent    Yeast Absent Absent    Clue Cells Absent Absent    WBCs/high power field None None          Meagan Gunderson, CNP  [facility]

## 2023-03-22 NOTE — PROGRESS NOTES
Assessment & Plan          Dysuria  - Wet prep  - UA reflex to Microscopic and Culture - [facility]  - UA Microscopic with Reflex to Culture        Acute cystitis without hematuria  - ciprofloxacin (CIPRO) 500 MG tablet; Take 1 tablet (500 mg) by mouth 2 times daily for 7 days          Monitor for fever  Increase fluid intake  Follow-up if unimproved        Meagan Gunderson CNP  [facility] - [place] Bat[place] is a 57 year old, presenting for the following health issues:  Urinary Problem           Genitourinary - Female  Onset/Duration: a few weeks  Description:   Painful urination (Dysuria): YES           Frequency: YES  Blood in urine (Hematuria): No  Delay in urine (Hesitency): No  Intensity: moderate  Progression of Symptoms:  worsening  Accompanying Signs & Symptoms:  Fever/chills: No  Flank pain: No  Nausea and vomiting: No  Vaginal symptoms: feels there might be a problem  Abdominal/Pelvic Pain: YES  History:   History of frequent UTI s: No  History of kidney stones: YES  Sexually Active: No  Possibility of pregnancy: No  Precipitating or alleviating factors: None  Therapies tried and outcome: Cranberry juice prn (contraindicated in Coumadin patients)            There is no problem list on file for this patient.    No past surgical history on file.    Social History     Tobacco Use     Smoking status: Never     Smokeless tobacco: Never   Substance Use Topics     Alcohol use: Not on file     Family History   Problem Relation Age of Onset     Heart Disease Mother      Pancreatic Cancer Father              Current Outpatient Medications   Medication Sig Dispense Refill     levothyroxine (SYNTHROID/LEVOTHROID) 112 MCG tablet Take 112 mcg by mouth daily       metFORMIN (GLUCOPHAGE) 500 MG tablet Take 1 tablet (500 mg) by mouth 2 times daily (with meals) 1 tablet 1     rosuvastatin (CRESTOR) 10 MG tablet Take 10 mg by mouth daily       Semaglutide (RYBELSUS) 14 MG TABS Take 14 mg by mouth daily  90 tablet 1     senna (SENOKOT) 8.6 MG tablet Take 1 tablet by mouth 2 times daily           Allergies   Allergen Reactions     Asa [Aspirin] Hives     Ivp Dye [Contrast Dye] Hives     Pcn [Penicillins] Hives     Sulfa Drugs Hives         Recent Labs   Lab Test 02/21/23  0955 12/16/18  1240   A1C 6.0*  --    LDL 85  --    HDL 49*  --    TRIG 159*  --    ALT 38* 27   CR 0.68 0.61   GFRESTIMATED >90 >90   GFRESTBLACK  --  >90   POTASSIUM 4.3 4.0   TSH 0.11*  --           BP Readings from Last 3 Encounters:   03/22/23 128/76   02/21/23 113/74   12/16/18 112/75    Wt Readings from Last 3 Encounters:   03/22/23 70.3 kg (155 lb)   02/21/23 72.8 kg (160 lb 9.6 oz)                Review of Systems   Constitutional, HEENT, cardiovascular, pulmonary, gi and gu systems are negative, except as otherwise noted.            Objective    /76 (BP Location: Left arm, Patient Position: Sitting, Cuff Size: Adult Regular)   Pulse 80   Temp 97.5  F (36.4  C) (Tympanic)   Resp 12   Wt 70.3 kg (155 lb)   SpO2 99%   BMI 25.02 kg/m    Body mass index is 25.02 kg/m .         Physical Exam   GENERAL: healthy, alert and no distress  EYES: Eyes grossly normal to inspection, PERRL and conjunctivae and sclerae normal  HENT: ear canals and TM's normal, nose and mouth without ulcers or lesions  NECK: no adenopathy, no asymmetry, masses, or scars and thyroid normal to palpation  RESP: lungs clear to auscultation - no rales, rhonchi or wheezes  CV: regular rate and rhythm, normal S1 S2, no S3 or S4, no murmur, click or rub, no peripheral edema and peripheral pulses strong  ABDOMEN: pelvic tenderness  MS: no gross musculoskeletal defects noted, no edema            Results for orders placed or performed in visit on 03/22/23   *UA reflex to Microscopic and Culture - [facility]     Status: Abnormal    Specimen: Urine, Midstream   Result Value Ref Range    Color Urine Yellow Colorless, Straw, Light Yellow, Yellow    Appearance Urine Clear  Clear    Glucose Urine Negative Negative mg/dL    Bilirubin Urine Negative Negative    Ketones Urine Negative Negative mg/dL    Specific Gravity Urine 1.025 1.003 - 1.035    Blood Urine Trace (A) Negative    pH Urine 6.0 5.0 - 7.0    Protein Albumin Urine Negative Negative mg/dL    Urobilinogen Urine 0.2 0.2, 1.0 E.U./dL    Nitrite Urine Negative Negative    Leukocyte Esterase Urine Small (A) Negative   UA Microscopic with Reflex to Culture     Status: Abnormal   Result Value Ref Range    Bacteria Urine Few (A) None Seen /HPF    RBC Urine 2-5 (A) 0-2 /HPF /HPF    WBC Urine 5-10 (A) 0-5 /HPF /HPF    Squamous Epithelials Urine Few (A) None Seen /LPF    Mucus Urine Present (A) None Seen /LPF    Narrative    Urine Culture not indicated   Wet prep     Status: Normal    Specimen: Vagina; Swab   Result Value Ref Range    Trichomonas Absent Absent    Yeast Absent Absent    Clue Cells Absent Absent    WBCs/high power field None None

## 2023-03-23 LAB — BACTERIA UR CULT: NORMAL

## 2023-04-27 ENCOUNTER — TELEPHONE (OUTPATIENT)
Dept: INTERNAL MEDICINE | Facility: OTHER | Age: 58
End: 2023-04-27

## 2023-04-27 NOTE — TELEPHONE ENCOUNTER
Received PA from [facility] for Mounjaro 5MG/0.5ML pen-injectors. Submitted on CMM. Waiting for a response.

## 2023-04-28 NOTE — TELEPHONE ENCOUNTER
Received DENIAL from [facility] for Mounjaro 5MG/0.5ML pen-injectors. 04/27/2023    Forms scanned to Epic.

## 2023-05-01 NOTE — TELEPHONE ENCOUNTER
Patient notified of denial.    r  Patient has 2 type of insurance. Wondering if you can do a prior auth through [employer] [place] [employer] also  PATI TRIANA, DANII\

## 2023-05-04 NOTE — TELEPHONE ENCOUNTER
Call returned to patient, no answer, message left requesting a return call.     Provide patient with an update the pharmacy has no information on file for [employer] Coverage. Patient to provide pharmacy with this information.     Awaiting a return call.

## 2023-05-07 ENCOUNTER — HEALTH MAINTENANCE LETTER (OUTPATIENT)
Age: 58
End: 2023-05-07

## 2023-05-11 NOTE — TELEPHONE ENCOUNTER
Received PA from [facility] for Mounjaro 5MG/0.5ML pen-injectors with new insurance. Submitted on CMM. Waiting for a response.

## 2023-05-11 NOTE — TELEPHONE ENCOUNTER
Call placed to patient, update provided on denial for mounjaro from insurance company. Patient will continue on [employer].

## 2023-05-11 NOTE — TELEPHONE ENCOUNTER
Received DENIAL from [employer] for Mounjaro 5MG/0.5ML pen-injectors. 05/11/2023    Forms scanned to Epic.

## 2023-05-23 ENCOUNTER — OFFICE VISIT (OUTPATIENT)
Dept: INTERNAL MEDICINE | Facility: OTHER | Age: 58
End: 2023-05-23
Attending: INTERNAL MEDICINE
Payer: COMMERCIAL

## 2023-05-23 ENCOUNTER — MYC MEDICAL ADVICE (OUTPATIENT)
Dept: INTERNAL MEDICINE | Facility: OTHER | Age: 58
End: 2023-05-23

## 2023-05-23 VITALS
SYSTOLIC BLOOD PRESSURE: 126 MMHG | WEIGHT: 167 LBS | BODY MASS INDEX: 26.95 KG/M2 | TEMPERATURE: 98.5 F | DIASTOLIC BLOOD PRESSURE: 82 MMHG | OXYGEN SATURATION: 97 % | HEART RATE: 85 BPM | RESPIRATION RATE: 20 BRPM

## 2023-05-23 DIAGNOSIS — S06.0XAA CONCUSSION WITH UNKNOWN LOSS OF CONSCIOUSNESS STATUS, INITIAL ENCOUNTER: ICD-10-CM

## 2023-05-23 DIAGNOSIS — K12.0 CANKER SORE: ICD-10-CM

## 2023-05-23 DIAGNOSIS — R79.89 ELEVATED LFTS: Primary | ICD-10-CM

## 2023-05-23 DIAGNOSIS — R73.09 IMPAIRED GLUCOSE TOLERANCE TEST: ICD-10-CM

## 2023-05-23 PROCEDURE — 99215 OFFICE O/P EST HI 40 MIN: CPT | Performed by: INTERNAL MEDICINE

## 2023-05-23 RX ORDER — ACYCLOVIR 400 MG/1
400 TABLET ORAL EVERY 8 HOURS
Qty: 15 TABLET | Refills: 0 | Status: SHIPPED | OUTPATIENT
Start: 2023-05-23 | End: 2024-05-21

## 2023-05-23 ASSESSMENT — PAIN SCALES - GENERAL: PAINLEVEL: NO PAIN (0)

## 2023-05-23 NOTE — PROGRESS NOTES
Assessment & Plan   Problem List Items Addressed This Visit    None  Visit Diagnoses     Elevated LFTs    -  Primary    Relevant Orders    CBC with Platelets & Differential    Comprehensive metabolic panel    US Abdomen Limited    Impaired glucose tolerance test        Relevant Medications    semaglutide (OZEMPIC) 2 MG/3ML pen    Other Relevant Orders    Cortisol    Hemoglobin A1c    Concussion with unknown loss of consciousness status, initial encounter        Relevant Orders    CT Head w/o Contrast    Canker sore        Relevant Medications    acyclovir (ZOVIRAX) 400 MG tablet           Review of prior external note(s) from - Outside records from [facility]  45 minutes spent by me on the date of the encounter doing chart review, review of outside records, review of test results, interpretation of tests, patient visit and documentation            No follow-ups on file.    Brock Peck, [facility] - [place] Batres is a 57 year old, presenting for the following health issues:  Headache         View : No data to display.              NOHEMI Batres presents today with several concerns.  First she reports falling and striking her head a month ago.  She reports HAs since that time and grey patches of vision at times that come and go.  Symptoms seem to be improving slowing.  She was not seen following this fall.  She slipped on ice and struck her forehead.  Next she would like to discuss transitioning to Ozempic due to impaired fasting glucose as she has severe constipation with this.Her A1Cs have been in the ^+ range historically but has not been diagnosed as diabetic.  She is also concerned about her history of REDDING and elevated LFTs.  She would like to have this further looked at.  She is also due for routine labs today. She also reports ongoing bloating and difficult losing weight but states she has not been as active since her fall.      Concern - headache- intermittent  "since fall x 1 month ago (4/8/23)  Onset: 4/8/23  Description: headache off and on over the last month   Intensity: mild- moderate  Progression of Symptoms:  improving  Accompanying Signs & Symptoms: \"gray out episodes-vision goes gray for a few seconds and vision returns to normal\", denies nausea or vomiting.   Previous history of similar problem: no   Precipitating factors:        Worsened by: none   Alleviating factors:        Improved by: sitting down, taking ibuprofen or tylenol   Therapies tried and outcome: ibuprofen, tylenol           Review of Systems   Constitutional, HEENT, cardiovascular, pulmonary, gi and gu systems are negative, except as otherwise noted.      Objective    /82 (BP Location: Left arm, Patient Position: Sitting, Cuff Size: Adult Regular)   Pulse 85   Temp 98.5  F (36.9  C) (Tympanic)   Resp 20   Wt 75.8 kg (167 lb)   SpO2 97%   BMI 26.95 kg/m    Body mass index is 26.95 kg/m .  Physical Exam   GENERAL: healthy, alert and no distress  EYES: Eyes grossly normal to inspection, PERRL and conjunctivae and sclerae normal  RESP: lungs clear to auscultation - no rales, rhonchi or wheezes  CV: regular rate and rhythm, normal S1 S2, no S3 or S4, no murmur, click or rub, no peripheral edema and peripheral pulses strong  MS: no gross musculoskeletal defects noted, no edema  NEURO: Normal strength and tone, mentation intact and speech normal  PSYCH: mentation appears normal, affect normal/bright    Office Visit on 03/22/2023   Component Date Value Ref Range Status     Trichomonas 03/22/2023 Absent  Absent Final     Yeast 03/22/2023 Absent  Absent Final     Clue Cells 03/22/2023 Absent  Absent Final     WBCs/high power field 03/22/2023 None  None Final     Color Urine 03/22/2023 Yellow  Colorless, Straw, Light Yellow, Yellow Final     Appearance Urine 03/22/2023 Clear  Clear Final     Glucose Urine 03/22/2023 Negative  Negative mg/dL Final     Bilirubin Urine 03/22/2023 Negative  Negative " Final     Ketones Urine 03/22/2023 Negative  Negative mg/dL Final     Specific Gravity Urine 03/22/2023 1.025  1.003 - 1.035 Final     Blood Urine 03/22/2023 Trace (A)  Negative Final     pH Urine 03/22/2023 6.0  5.0 - 7.0 Final     Protein Albumin Urine 03/22/2023 Negative  Negative mg/dL Final     Urobilinogen Urine 03/22/2023 0.2  0.2, 1.0 E.U./dL Final     Nitrite Urine 03/22/2023 Negative  Negative Final     Leukocyte Esterase Urine 03/22/2023 Small (A)  Negative Final     Bacteria Urine 03/22/2023 Few (A)  None Seen /HPF Final     RBC Urine 03/22/2023 2-5 (A)  0-2 /HPF /HPF Final     WBC Urine 03/22/2023 5-10 (A)  0-5 /HPF /HPF Final     Squamous Epithelials Urine 03/22/2023 Few (A)  None Seen /LPF Final     Mucus Urine 03/22/2023 Present (A)  None Seen /LPF Final     Culture 03/22/2023 10,000-50,000 CFU/mL Mixture of urogenital calixto   Final     No results found for any visits on 05/23/23.  No results found for this or any previous visit (from the past 24 hour(s)).

## 2023-05-24 NOTE — TELEPHONE ENCOUNTER
Called Sandra in [place]. She is scheduled for both scans this Friday.Pt updated no eat,drink,smoke 8 hours before US. Can take sip of water with her pills.    Pt updated. Please note.    Herlinda HERRING RN

## 2023-05-24 NOTE — TELEPHONE ENCOUNTER
She needs these sooner, concussion with ongoing symptoms.   Be nice if they could do US at same time but not as urgent.  Check with out radiology.

## 2023-05-25 ENCOUNTER — LAB (OUTPATIENT)
Dept: LAB | Facility: OTHER | Age: 58
End: 2023-05-25
Payer: COMMERCIAL

## 2023-05-25 DIAGNOSIS — R79.89 ELEVATED LFTS: ICD-10-CM

## 2023-05-25 DIAGNOSIS — R73.09 IMPAIRED GLUCOSE TOLERANCE TEST: ICD-10-CM

## 2023-05-25 LAB
ALBUMIN SERPL BCG-MCNC: 4.6 G/DL (ref 3.5–5.2)
ALP SERPL-CCNC: 51 U/L (ref 35–104)
ALT SERPL W P-5'-P-CCNC: 32 U/L (ref 10–35)
ANION GAP SERPL CALCULATED.3IONS-SCNC: 10 MMOL/L (ref 7–15)
AST SERPL W P-5'-P-CCNC: 26 U/L (ref 10–35)
BASOPHILS # BLD AUTO: 0 10E3/UL (ref 0–0.2)
BASOPHILS NFR BLD AUTO: 0 %
BILIRUB SERPL-MCNC: 0.3 MG/DL
BUN SERPL-MCNC: 15.5 MG/DL (ref 6–20)
CALCIUM SERPL-MCNC: 9.8 MG/DL (ref 8.6–10)
CHLORIDE SERPL-SCNC: 99 MMOL/L (ref 98–107)
CORTIS SERPL-MCNC: 10.8 UG/DL
CREAT SERPL-MCNC: 0.72 MG/DL (ref 0.51–0.95)
DEPRECATED HCO3 PLAS-SCNC: 26 MMOL/L (ref 22–29)
EOSINOPHIL # BLD AUTO: 0.1 10E3/UL (ref 0–0.7)
EOSINOPHIL NFR BLD AUTO: 3 %
ERYTHROCYTE [DISTWIDTH] IN BLOOD BY AUTOMATED COUNT: 12.5 % (ref 10–15)
EST. AVERAGE GLUCOSE BLD GHB EST-MCNC: 126 MG/DL
GFR SERPL CREATININE-BSD FRML MDRD: >90 ML/MIN/1.73M2
GLUCOSE SERPL-MCNC: 91 MG/DL (ref 70–99)
HBA1C MFR BLD: 6 %
HCT VFR BLD AUTO: 40.4 % (ref 35–47)
HGB BLD-MCNC: 13.3 G/DL (ref 11.7–15.7)
LYMPHOCYTES # BLD AUTO: 1.4 10E3/UL (ref 0.8–5.3)
LYMPHOCYTES NFR BLD AUTO: 31 %
MCH RBC QN AUTO: 29.2 PG (ref 26.5–33)
MCHC RBC AUTO-ENTMCNC: 32.9 G/DL (ref 31.5–36.5)
MCV RBC AUTO: 89 FL (ref 78–100)
MONOCYTES # BLD AUTO: 0.5 10E3/UL (ref 0–1.3)
MONOCYTES NFR BLD AUTO: 11 %
NEUTROPHILS # BLD AUTO: 2.5 10E3/UL (ref 1.6–8.3)
NEUTROPHILS NFR BLD AUTO: 55 %
PLATELET # BLD AUTO: 268 10E3/UL (ref 150–450)
POTASSIUM SERPL-SCNC: 3.9 MMOL/L (ref 3.4–5.3)
PROT SERPL-MCNC: 7.5 G/DL (ref 6.4–8.3)
RBC # BLD AUTO: 4.55 10E6/UL (ref 3.8–5.2)
SODIUM SERPL-SCNC: 135 MMOL/L (ref 136–145)
WBC # BLD AUTO: 4.5 10E3/UL (ref 4–11)

## 2023-05-25 PROCEDURE — 36415 COLL VENOUS BLD VENIPUNCTURE: CPT

## 2023-05-25 PROCEDURE — 80053 COMPREHEN METABOLIC PANEL: CPT

## 2023-05-25 PROCEDURE — 85025 COMPLETE CBC W/AUTO DIFF WBC: CPT

## 2023-05-25 PROCEDURE — 83036 HEMOGLOBIN GLYCOSYLATED A1C: CPT

## 2023-05-25 PROCEDURE — 82533 TOTAL CORTISOL: CPT

## 2023-05-31 ENCOUNTER — HOSPITAL ENCOUNTER (OUTPATIENT)
Dept: ULTRASOUND IMAGING | Facility: HOSPITAL | Age: 58
Discharge: HOME | End: 2023-05-31
Attending: INTERNAL MEDICINE
Payer: COMMERCIAL

## 2023-05-31 ENCOUNTER — HOSPITAL ENCOUNTER (OUTPATIENT)
Dept: CT IMAGING | Facility: HOSPITAL | Age: 58
Discharge: HOME | End: 2023-05-31
Attending: INTERNAL MEDICINE
Payer: COMMERCIAL

## 2023-05-31 DIAGNOSIS — R79.89 ELEVATED LFTS: ICD-10-CM

## 2023-05-31 DIAGNOSIS — N20.0 NEPHROLITHIASIS: Primary | ICD-10-CM

## 2023-05-31 DIAGNOSIS — S06.0XAA CONCUSSION WITH UNKNOWN LOSS OF CONSCIOUSNESS STATUS, INITIAL ENCOUNTER: ICD-10-CM

## 2023-05-31 PROCEDURE — 76705 ECHO EXAM OF ABDOMEN: CPT

## 2023-05-31 PROCEDURE — 70450 CT HEAD/BRAIN W/O DYE: CPT

## 2023-06-05 ENCOUNTER — MYC MEDICAL ADVICE (OUTPATIENT)
Dept: INTERNAL MEDICINE | Facility: OTHER | Age: 58
End: 2023-06-05

## 2023-06-08 ENCOUNTER — HOSPITAL ENCOUNTER (OUTPATIENT)
Dept: CT IMAGING | Facility: HOSPITAL | Age: 58
Discharge: HOME | End: 2023-06-08
Attending: INTERNAL MEDICINE | Admitting: INTERNAL MEDICINE
Payer: COMMERCIAL

## 2023-06-08 DIAGNOSIS — N20.0 NEPHROLITHIASIS: ICD-10-CM

## 2023-06-08 PROCEDURE — 74176 CT ABD & PELVIS W/O CONTRAST: CPT

## 2023-06-09 DIAGNOSIS — N20.0 KIDNEY STONE: Primary | ICD-10-CM

## 2023-06-14 ENCOUNTER — MYC MEDICAL ADVICE (OUTPATIENT)
Dept: INTERNAL MEDICINE | Facility: OTHER | Age: 58
End: 2023-06-14

## 2023-06-14 DIAGNOSIS — R73.01 IMPAIRED FASTING GLUCOSE: Primary | ICD-10-CM

## 2023-06-20 NOTE — TELEPHONE ENCOUNTER
Unfortunately due to the limited Urology availability in the area they are reviewing chart as to prioritizing patients.  Other option would be trying [facility] in [facility] to see if they have sooner availability.

## 2023-06-20 NOTE — TELEPHONE ENCOUNTER
Please see MyChart response on Urology appt.    She is wondering what Ozempic dose should be?    Please advise.    Herlinda HERRING RN

## 2023-06-21 NOTE — TELEPHONE ENCOUNTER
Pt is doing well with Ozempic.  Pt is fine staying with current dose but want to know if you want to make any changes before refilling it.     Pt wondering if you would like a CMP done?  I did see an order has been put in.  Would you like me to have her schedule a labs only appointment?    Please see MCM and advise  Thanks!

## 2023-06-21 NOTE — TELEPHONE ENCOUNTER
How is she doing with it?  Side effects?  She has only been on it a month so not certain we should go up yet?  CMP has been ordered to check renal function which she should do in next coulpe weeks.

## 2023-06-26 ENCOUNTER — LAB (OUTPATIENT)
Dept: LAB | Facility: OTHER | Age: 58
End: 2023-06-26
Attending: INTERNAL MEDICINE
Payer: COMMERCIAL

## 2023-06-26 ENCOUNTER — ALLIED HEALTH/NURSE VISIT (OUTPATIENT)
Dept: FAMILY MEDICINE | Facility: OTHER | Age: 58
End: 2023-06-26
Attending: INTERNAL MEDICINE
Payer: COMMERCIAL

## 2023-06-26 DIAGNOSIS — R73.01 IMPAIRED FASTING GLUCOSE: ICD-10-CM

## 2023-06-26 DIAGNOSIS — Z23 NEED FOR VACCINATION AGAINST HEPATITIS B VIRUS: Primary | ICD-10-CM

## 2023-06-26 LAB
ALBUMIN SERPL BCG-MCNC: 4.5 G/DL (ref 3.5–5.2)
ALP SERPL-CCNC: 52 U/L (ref 35–104)
ALT SERPL W P-5'-P-CCNC: 45 U/L (ref 0–50)
ANION GAP SERPL CALCULATED.3IONS-SCNC: 13 MMOL/L (ref 7–15)
AST SERPL W P-5'-P-CCNC: 29 U/L (ref 0–45)
BILIRUB SERPL-MCNC: 0.2 MG/DL
BUN SERPL-MCNC: 15.1 MG/DL (ref 8–23)
CALCIUM SERPL-MCNC: 10.1 MG/DL (ref 8.6–10)
CHLORIDE SERPL-SCNC: 99 MMOL/L (ref 98–107)
CREAT SERPL-MCNC: 0.69 MG/DL (ref 0.51–0.95)
DEPRECATED HCO3 PLAS-SCNC: 25 MMOL/L (ref 22–29)
GFR SERPL CREATININE-BSD FRML MDRD: >90 ML/MIN/1.73M2
GLUCOSE SERPL-MCNC: 114 MG/DL (ref 70–99)
HOLD SPECIMEN: NORMAL
POTASSIUM SERPL-SCNC: 4.2 MMOL/L (ref 3.4–5.3)
PROT SERPL-MCNC: 7.4 G/DL (ref 6.4–8.3)
SODIUM SERPL-SCNC: 137 MMOL/L (ref 136–145)

## 2023-06-26 PROCEDURE — 90471 IMMUNIZATION ADMIN: CPT

## 2023-06-26 PROCEDURE — 80053 COMPREHEN METABOLIC PANEL: CPT

## 2023-06-26 PROCEDURE — 90746 HEPB VACCINE 3 DOSE ADULT IM: CPT

## 2023-06-26 PROCEDURE — 36415 COLL VENOUS BLD VENIPUNCTURE: CPT

## 2023-06-26 NOTE — PROGRESS NOTES
Patient presents to Clinic for Nurse Only - Hepatits B - ok to start hepatitis B series per Suzanna William CNP. Patient will return in 1 month and 4 months for series of 3.       Clinic Administered Medication Documentation      Injectable Medication Documentation    Is there an active order (written within the past 365 days, with administrations remaining, not ) in the chart? Yes.     Patient was given Hepatitis B. Prior to medication administration, verified patient's identity using patient s name and date of birth. Please see MAR and medication order for additional information. Patient instructed to remain in clinic for 15 minutes, report any adverse reaction to staff immediately and remain in clinic for 15 minutes and report any adverse reaction to staff immediately but patient declined.    Vial/Syringe: Single dose vial. Was entire vial of medication used? Yes  Was this medication supplied by the patient? No  Is this a medication the patient will need to receive again? Yes. Patient has no refills remaining.

## 2023-07-25 ENCOUNTER — ALLIED HEALTH/NURSE VISIT (OUTPATIENT)
Dept: FAMILY MEDICINE | Facility: OTHER | Age: 58
End: 2023-07-25
Attending: INTERNAL MEDICINE
Payer: COMMERCIAL

## 2023-07-25 DIAGNOSIS — Z23 NEED FOR VACCINATION: Primary | ICD-10-CM

## 2023-07-25 PROCEDURE — 90471 IMMUNIZATION ADMIN: CPT | Performed by: INTERNAL MEDICINE

## 2023-07-25 PROCEDURE — 90746 HEPB VACCINE 3 DOSE ADULT IM: CPT | Performed by: INTERNAL MEDICINE

## 2023-07-25 NOTE — PROGRESS NOTES
Prior to immunization administration, verified patients identity using patient s name and date of birth. Please see Immunization Activity for additional information.     Screening Questionnaire for Adult Immunization    Are you sick today?   No   Do you have allergies to medications, food, a vaccine component or latex?   No   Have you ever had a serious reaction after receiving a vaccination?   No   Do you have a long-term health problem with heart, lung, kidney, or metabolic disease (e.g., diabetes), asthma, a blood disorder, no spleen, complement component deficiency, a cochlear implant, or a spinal fluid leak?  Are you on long-term aspirin therapy?   No   Do you have cancer, leukemia, HIV/AIDS, or any other immune system problem?   No   Do you have a parent, brother, or sister with an immune system problem?   No   In the past 3 months, have you taken medications that affect  your immune system, such as prednisone, other steroids, or anticancer drugs; drugs for the treatment of rheumatoid arthritis, Crohn s disease, or psoriasis; or have you had radiation treatments?   No   Have you had a seizure, or a brain or other nervous system problem?   No   During the past year, have you received a transfusion of blood or blood    products, or been given immune (gamma) globulin or antiviral drug?   No   For women: Are you pregnant or is there a chance you could become       pregnant during the next month?   No   Have you received any vaccinations in the past 4 weeks?   No     Immunization questionnaire answers were all negative.    I have reviewed the following standing orders:   This patient is due and qualifies for the Hepatitis B vaccine.    Click here for Hepatitis B Standing Order    I have reviewed the vaccines inclusion and exclusion criteria; No concerns regarding eligibility.     Patient instructed to remain in clinic for 15 minutes afterwards, and to report any adverse reactions.     Screening performed by Mary Ann  OBDULIA Armendariz RN on 7/25/2023 at 11:06 AM.

## 2023-07-31 ENCOUNTER — MYC MEDICAL ADVICE (OUTPATIENT)
Dept: INTERNAL MEDICINE | Facility: OTHER | Age: 58
End: 2023-07-31

## 2023-07-31 DIAGNOSIS — R73.01 IMPAIRED FASTING GLUCOSE: Primary | ICD-10-CM

## 2023-08-01 NOTE — TELEPHONE ENCOUNTER
Please see MCM and advise.    LOV 05/23/2023    Pended Ozempic 0.5 mg every 7 days  Diagnosis: impaired glucose tolerance test  Please review and sign if appropriate

## 2023-08-10 ENCOUNTER — MYC REFILL (OUTPATIENT)
Dept: INTERNAL MEDICINE | Facility: OTHER | Age: 58
End: 2023-08-10

## 2023-08-10 DIAGNOSIS — E78.5 HYPERLIPIDEMIA LDL GOAL <100: Primary | ICD-10-CM

## 2023-08-10 NOTE — TELEPHONE ENCOUNTER
Rosuvastatin (Crestor) 10 MG tablet    Last Written Prescription Date:  08/10/2023  Last Fill Quantity: 30,   # refills: 1  Last Office Visit: 05/23/2023

## 2023-08-11 RX ORDER — ROSUVASTATIN CALCIUM 10 MG/1
10 TABLET, COATED ORAL DAILY
Qty: 30 TABLET | Refills: 1 | Status: SHIPPED | OUTPATIENT
Start: 2023-08-11 | End: 2023-10-10

## 2023-10-01 ENCOUNTER — TRANSFERRED RECORDS (OUTPATIENT)
Dept: MULTI SPECIALTY CLINIC | Facility: CLINIC | Age: 58
End: 2023-10-01

## 2023-10-01 LAB — PAP SMEAR - HIM PATIENT REPORTED: NORMAL

## 2023-10-02 ENCOUNTER — TELEPHONE (OUTPATIENT)
Dept: INTERNAL MEDICINE | Facility: OTHER | Age: 58
End: 2023-10-02

## 2023-10-09 ENCOUNTER — TRANSFERRED RECORDS (OUTPATIENT)
Dept: HEALTH INFORMATION MANAGEMENT | Facility: CLINIC | Age: 58
End: 2023-10-09

## 2023-10-09 DIAGNOSIS — E78.5 HYPERLIPIDEMIA LDL GOAL <100: ICD-10-CM

## 2023-10-09 LAB — RETINOPATHY: NEGATIVE

## 2023-10-10 RX ORDER — ROSUVASTATIN CALCIUM 10 MG/1
10 TABLET, COATED ORAL DAILY
Qty: 30 TABLET | Refills: 3 | Status: SHIPPED | OUTPATIENT
Start: 2023-10-10 | End: 2023-12-20

## 2023-10-10 NOTE — TELEPHONE ENCOUNTER
Crestor      Last Written Prescription Date:  8/11/23  Last Fill Quantity: 30,   # refills: 1  Last Office Visit: 5/23/23  Future Office visit:    Next 5 appointments (look out 90 days)      Dec 26, 2023  1:15 PM  (Arrive by 1:00 PM)  Nurse Only with [facility]SE  [facility] [place] ([facility] ) [id number]  [facility] [id number]  [phone]             Routing refill request to provider for review/approval because:

## 2023-10-16 ENCOUNTER — OFFICE VISIT (OUTPATIENT)
Dept: INTERNAL MEDICINE | Facility: OTHER | Age: 58
End: 2023-10-16
Attending: INTERNAL MEDICINE
Payer: COMMERCIAL

## 2023-10-16 VITALS
BODY MASS INDEX: 26.15 KG/M2 | HEART RATE: 63 BPM | TEMPERATURE: 97.5 F | SYSTOLIC BLOOD PRESSURE: 118 MMHG | DIASTOLIC BLOOD PRESSURE: 72 MMHG | OXYGEN SATURATION: 97 % | WEIGHT: 162 LBS | RESPIRATION RATE: 20 BRPM

## 2023-10-16 DIAGNOSIS — E03.9 ACQUIRED HYPOTHYROIDISM: ICD-10-CM

## 2023-10-16 DIAGNOSIS — R73.03 PRE-DIABETES: ICD-10-CM

## 2023-10-16 DIAGNOSIS — N20.0 NEPHROLITHIASIS: ICD-10-CM

## 2023-10-16 DIAGNOSIS — Z01.818 PRE-OP EVALUATION: Primary | ICD-10-CM

## 2023-10-16 LAB
ALBUMIN SERPL BCG-MCNC: 4.6 G/DL (ref 3.5–5.2)
ALP SERPL-CCNC: 46 U/L (ref 35–104)
ALT SERPL W P-5'-P-CCNC: 37 U/L (ref 0–50)
ANION GAP SERPL CALCULATED.3IONS-SCNC: 12 MMOL/L (ref 7–15)
AST SERPL W P-5'-P-CCNC: 23 U/L (ref 0–45)
BASO+EOS+MONOS # BLD AUTO: NORMAL 10*3/UL
BASO+EOS+MONOS NFR BLD AUTO: NORMAL %
BASOPHILS # BLD AUTO: 0 10E3/UL (ref 0–0.2)
BASOPHILS NFR BLD AUTO: 0 %
BILIRUB SERPL-MCNC: 0.3 MG/DL
BUN SERPL-MCNC: 16 MG/DL (ref 8–23)
CALCIUM SERPL-MCNC: 9.4 MG/DL (ref 8.6–10)
CHLORIDE SERPL-SCNC: 103 MMOL/L (ref 98–107)
CREAT SERPL-MCNC: 0.73 MG/DL (ref 0.51–0.95)
DEPRECATED HCO3 PLAS-SCNC: 25 MMOL/L (ref 22–29)
EGFRCR SERPLBLD CKD-EPI 2021: >90 ML/MIN/1.73M2
EOSINOPHIL # BLD AUTO: 0.1 10E3/UL (ref 0–0.7)
EOSINOPHIL NFR BLD AUTO: 2 %
ERYTHROCYTE [DISTWIDTH] IN BLOOD BY AUTOMATED COUNT: 12.2 % (ref 10–15)
EST. AVERAGE GLUCOSE BLD GHB EST-MCNC: 120 MG/DL
GLUCOSE SERPL-MCNC: 90 MG/DL (ref 70–99)
HBA1C MFR BLD: 5.8 %
HCT VFR BLD AUTO: 39.5 % (ref 35–47)
HGB BLD-MCNC: 13.4 G/DL (ref 11.7–15.7)
IMM GRANULOCYTES # BLD: 0 10E3/UL
IMM GRANULOCYTES NFR BLD: 0 %
LYMPHOCYTES # BLD AUTO: 1.5 10E3/UL (ref 0.8–5.3)
LYMPHOCYTES NFR BLD AUTO: 37 %
MCH RBC QN AUTO: 28.9 PG (ref 26.5–33)
MCHC RBC AUTO-ENTMCNC: 33.9 G/DL (ref 31.5–36.5)
MCV RBC AUTO: 85 FL (ref 78–100)
MONOCYTES # BLD AUTO: 0.4 10E3/UL (ref 0–1.3)
MONOCYTES NFR BLD AUTO: 10 %
NEUTROPHILS # BLD AUTO: 2.1 10E3/UL (ref 1.6–8.3)
NEUTROPHILS NFR BLD AUTO: 51 %
PLATELET # BLD AUTO: 263 10E3/UL (ref 150–450)
POTASSIUM SERPL-SCNC: 4.1 MMOL/L (ref 3.4–5.3)
PROT SERPL-MCNC: 7.3 G/DL (ref 6.4–8.3)
RBC # BLD AUTO: 4.63 10E6/UL (ref 3.8–5.2)
SODIUM SERPL-SCNC: 140 MMOL/L (ref 135–145)
T4 FREE SERPL-MCNC: 1.73 NG/DL (ref 0.9–1.7)
TSH SERPL DL<=0.005 MIU/L-ACNC: 0.12 UIU/ML (ref 0.3–4.2)
WBC # BLD AUTO: 4.1 10E3/UL (ref 4–11)

## 2023-10-16 PROCEDURE — 83036 HEMOGLOBIN GLYCOSYLATED A1C: CPT | Performed by: INTERNAL MEDICINE

## 2023-10-16 PROCEDURE — 80050 GENERAL HEALTH PANEL: CPT | Performed by: INTERNAL MEDICINE

## 2023-10-16 PROCEDURE — 99214 OFFICE O/P EST MOD 30 MIN: CPT | Performed by: INTERNAL MEDICINE

## 2023-10-16 PROCEDURE — 84439 ASSAY OF FREE THYROXINE: CPT | Performed by: INTERNAL MEDICINE

## 2023-10-16 PROCEDURE — 36415 COLL VENOUS BLD VENIPUNCTURE: CPT | Performed by: INTERNAL MEDICINE

## 2023-10-16 PROCEDURE — 93000 ELECTROCARDIOGRAM COMPLETE: CPT | Mod: 77 | Performed by: INTERNAL MEDICINE

## 2023-10-16 ASSESSMENT — PAIN SCALES - GENERAL: PAINLEVEL: NO PAIN (0)

## 2023-10-16 NOTE — PROGRESS NOTES
[facility]  [address]  [place] [address]  Phone: [phone]  Primary Provider: Brock Miller  Pre-op Performing Provider: BROCK MILLER      PREOPERATIVE EVALUATION:  Today's date: 10/16/2023    Gisel is a 58 year old female who presents for a preoperative evaluation.      Surgical Information:  Surgery/Procedure: RT Kidney Stone Extraction   Surgery Location: [facility]   Surgeon: Dr. Bright   Surgery Date: 10/17/23  Time of Surgery: TBD  Where patient plans to recover: At home with family  Fax number for surgical facility: Note does not need to be faxed, will be available electronically in Epic.    Assessment & Plan     The proposed surgical procedure is considered INTERMEDIATE risk.    Problem List Items Addressed This Visit    None  Visit Diagnoses       Pre-op evaluation    -  Primary    Relevant Orders    EKG 12-lead complete w/read - (Clinic Performed) (Completed)    CBC with platelets and differential (Completed)    Comprehensive metabolic panel (BMP + Alb, Alk Phos, ALT, AST, Total. Bili, TP)    Pre-diabetes        Relevant Orders    Hemoglobin A1c    Acquired hypothyroidism        Relevant Orders    TSH    T4, free                    - No identified additional risk factors other than previously addressed    Antiplatelet or Anticoagulation Medication Instructions:   - Patient is on no antiplatelet or anticoagulation medications.    Additional Medication Instructions:  Patient is to hold all medications morning of procedure.    RECOMMENDATION:  APPROVAL GIVEN to proceed with proposed procedure, without further diagnostic evaluation.    Review of prior external note(s) from - Outside records from [facility]  25 minutes spent by me on the date of the encounter doing chart review, review of outside records, review of test results, interpretation of tests, patient visit, and documentation       Subjective       HPI related to  upcoming procedure: Right 16 mm stone removal/lithotripsy        10/13/2023     2:18 PM   Preop Questions   1. Have you ever had a heart attack or stroke? YES -    2. Have you ever had surgery on your heart or blood vessels, such as a stent placement, a coronary artery bypass, or surgery on an artery in your head, neck, heart, or legs? No   3. Do you have chest pain with activity? No   4. Do you have a history of  heart failure? No   5. Do you currently have a cold, bronchitis or symptoms of other infection? No   6. Do you have a cough, shortness of breath, or wheezing? No   7. Do you or anyone in your family have previous history of blood clots? No   8. Do you or does anyone in your family have a serious bleeding problem such as prolonged bleeding following surgeries or cuts? No   9. Have you ever had problems with anemia or been told to take iron pills? No   10. Have you had any abnormal blood loss such as black, tarry or bloody stools, or abnormal vaginal bleeding? No   11. Have you ever had a blood transfusion? No   12. Are you willing to have a blood transfusion if it is medically needed before, during, or after your surgery? Yes   13. Have you or any of your relatives ever had problems with anesthesia? No   14. Do you have sleep apnea, excessive snoring or daytime drowsiness? No   15. Do you have any artifical heart valves or other implanted medical devices like a pacemaker, defibrillator, or continuous glucose monitor? No   16. Do you have artificial joints? No   17. Are you allergic to latex? No   18. Is there any chance that you may be pregnant? No       Health Care Directive:  Patient does not have a Health Care Directive or Living Will: Discussed advance care planning with patient; however, patient declined at this time.    956}    Status of Chronic Conditions:  HYPERLIPIDEMIA - Patient has a long history of significant Hyperlipidemia requiring medication for treatment with recent fair control. Patient  reports no problems or side effects with the medication.     HYPOTHYROIDISM - Patient has a longstanding history of chronic Hypothyroidism. Patient has been doing well, noting no tremor, insomnia, hair loss or changes in skin texture. Continues to take medications as directed, without adverse reactions or side effects. Last TSH   Lab Results   Component Value Date    TSH 0.11 (L) 02/21/2023     Prediabetes    Review of Systems  Constitutional, neuro, ENT, endocrine, pulmonary, cardiac, gastrointestinal, genitourinary, musculoskeletal, integument and psychiatric systems are negative, except as otherwise noted.    There are no problems to display for this patient.     No past medical history on file.  No past surgical history on file.  Current Outpatient Medications   Medication Sig Dispense Refill    acyclovir (ZOVIRAX) 400 MG tablet Take 1 tablet (400 mg) by mouth every 8 hours for 5 days 15 tablet 0    levothyroxine (SYNTHROID/LEVOTHROID) 112 MCG tablet Take 112 mcg by mouth daily      metFORMIN (GLUCOPHAGE) 500 MG tablet Take 1 tablet (500 mg) by mouth 2 times daily (with meals) 1 tablet 1    rosuvastatin (CRESTOR) 10 MG tablet TAKE 1 TABLET (10 MG) BY MOUTH DAILY. 30 tablet 3    semaglutide (OZEMPIC) 2 MG/3ML pen Inject 0.5 mg Subcutaneous every 7 days 3 mL 0    semaglutide (OZEMPIC) 2 MG/3ML pen Inject 0.25 mg Subcutaneous every 7 days 3 mL 3    senna (SENOKOT) 8.6 MG tablet Take 1 tablet by mouth 2 times daily         Allergies   Allergen Reactions    Asa [Aspirin] Hives    Ivp Dye [Contrast Dye] Hives    Pcn [Penicillins] Hives    Sulfa Antibiotics Hives        Social History     Tobacco Use    Smoking status: Never    Smokeless tobacco: Never   Substance Use Topics    Alcohol use: Not on file     Family History   Problem Relation Age of Onset    Heart Disease Mother     Pancreatic Cancer Father      History   Drug Use Not on file         Objective     There were no vitals taken for this visit.    Physical  Exam    GENERAL APPEARANCE: healthy, alert and no distress     EYES: EOMI,  PERRL     HENT: ear canals and TM's normal and nose and mouth without ulcers or lesions     NECK: no adenopathy, no asymmetry, masses, or scars and thyroid normal to palpation     RESP: lungs clear to auscultation - no rales, rhonchi or wheezes     CV: regular rates and rhythm, normal S1 S2, no S3 or S4 and no murmur, click or rub     ABDOMEN:  soft, nontender, no HSM or masses and bowel sounds normal     MS: extremities normal- no gross deformities noted, no evidence of inflammation in joints, FROM in all extremities.     SKIN: no suspicious lesions or rashes     NEURO: Normal strength and tone, sensory exam grossly normal, mentation intact and speech normal     PSYCH: mentation appears normal. and affect normal/bright     LYMPHATICS: No cervical adenopathy    Recent Labs   Lab Test 06/26/23  1302 05/25/23  0834 02/21/23  0955   HGB  --  13.3 13.1   PLT  --  268 322    135* 140   POTASSIUM 4.2 3.9 4.3   CR 0.69 0.72 0.68   A1C  --  6.0* 6.0*        Diagnostics:  Recent Results (from the past 24 hour(s))   EKG 12-lead complete w/read - (Clinic Performed)    Collection Time: 10/16/23  8:07 AM   Result Value Ref Range    Systolic Blood Pressure  mmHg    Diastolic Blood Pressure  mmHg    Ventricular Rate 61 BPM    Atrial Rate 61 BPM    NM Interval 194 ms    QRS Duration 84 ms     ms    QTc 430 ms    P Axis 66 degrees    R AXIS 21 degrees    T Axis 26 degrees    Interpretation ECG       Sinus rhythm  Normal ECG  No previous ECGs available     Comprehensive metabolic panel (BMP + Alb, Alk Phos, ALT, AST, Total. Bili, TP)    Collection Time: 10/16/23  8:17 AM   Result Value Ref Range    Sodium 140 135 - 145 mmol/L    Potassium 4.1 3.4 - 5.3 mmol/L    Carbon Dioxide (CO2) 25 22 - 29 mmol/L    Anion Gap 12 7 - 15 mmol/L    Urea Nitrogen 16.0 8.0 - 23.0 mg/dL    Creatinine 0.73 0.51 - 0.95 mg/dL    GFR Estimate >90 >60 mL/min/1.73m2     Calcium 9.4 8.6 - 10.0 mg/dL    Chloride 103 98 - 107 mmol/L    Glucose 90 70 - 99 mg/dL    Alkaline Phosphatase 46 35 - 104 U/L    AST 23 0 - 45 U/L    ALT 37 0 - 50 U/L    Protein Total 7.3 6.4 - 8.3 g/dL    Albumin 4.6 3.5 - 5.2 g/dL    Bilirubin Total 0.3 <=1.2 mg/dL   TSH    Collection Time: 10/16/23  8:17 AM   Result Value Ref Range    TSH 0.12 (L) 0.30 - 4.20 uIU/mL   T4, free    Collection Time: 10/16/23  8:17 AM   Result Value Ref Range    Free T4 1.73 (H) 0.90 - 1.70 ng/dL   Hemoglobin A1c    Collection Time: 10/16/23  8:17 AM   Result Value Ref Range    Estimated Average Glucose 120 mg/dL    Hemoglobin A1C 5.8 (H) <5.7 %   CBC with platelets and differential    Collection Time: 10/16/23  8:17 AM   Result Value Ref Range    WBC Count 4.1 4.0 - 11.0 10e3/uL    RBC Count 4.63 3.80 - 5.20 10e6/uL    Hemoglobin 13.4 11.7 - 15.7 g/dL    Hematocrit 39.5 35.0 - 47.0 %    MCV 85 78 - 100 fL    MCH 28.9 26.5 - 33.0 pg    MCHC 33.9 31.5 - 36.5 g/dL    RDW 12.2 10.0 - 15.0 %    Platelet Count 263 150 - 450 10e3/uL    % Neutrophils 51 %    % Lymphocytes 37 %    % Monocytes 10 %    Mids % (Monos, Eos, Basos)      % Eosinophils 2 %    % Basophils 0 %    % Immature Granulocytes 0 %    Absolute Neutrophils 2.1 1.6 - 8.3 10e3/uL    Absolute Lymphocytes 1.5 0.8 - 5.3 10e3/uL    Absolute Monocytes 0.4 0.0 - 1.3 10e3/uL    Mids Abs (Monos, Eos, Basos)      Absolute Eosinophils 0.1 0.0 - 0.7 10e3/uL    Absolute Basophils 0.0 0.0 - 0.2 10e3/uL    Absolute Immature Granulocytes 0.0 <=0.4 10e3/uL      EKG: appears normal, NSR    Revised Cardiac Risk Index (RCRI):  The patient has the following serious cardiovascular risks for perioperative complications:   - No serious cardiac risks = 0 points     RCRI Interpretation: 0 points: Class I (very low risk - 0.4% complication rate)         Signed Electronically by: Brock Peck DO  Copy of this evaluation report is provided to requesting physician.

## 2023-10-26 LAB
ATRIAL RATE - MUSE: 61 BPM
DIASTOLIC BLOOD PRESSURE - MUSE: NORMAL MMHG
INTERPRETATION ECG - MUSE: NORMAL
P AXIS - MUSE: 66 DEGREES
PR INTERVAL - MUSE: 194 MS
QRS DURATION - MUSE: 84 MS
QT - MUSE: 428 MS
QTC - MUSE: 430 MS
R AXIS - MUSE: 21 DEGREES
SYSTOLIC BLOOD PRESSURE - MUSE: NORMAL MMHG
T AXIS - MUSE: 26 DEGREES
VENTRICULAR RATE- MUSE: 61 BPM

## 2023-11-13 DIAGNOSIS — R73.01 IMPAIRED FASTING GLUCOSE: ICD-10-CM

## 2023-11-13 RX ORDER — SEMAGLUTIDE 0.68 MG/ML
0.5 INJECTION, SOLUTION SUBCUTANEOUS
Qty: 3 ML | Refills: 0 | Status: SHIPPED | OUTPATIENT
Start: 2023-11-13 | End: 2023-12-17

## 2023-11-13 NOTE — TELEPHONE ENCOUNTER
Ozempic      Last Written Prescription Date:  8/1/23  Last Fill Quantity: 3mL,   # refills: 0  Last Office Visit: 10/16/23  Future Office visit:    Next 5 appointments (look out 90 days)      Dec 26, 2023  1:15 PM  (Arrive by 1:00 PM)  Nurse Only with [facility] FP NURSE  [facility] [place] ([facility] ) [address]  [facility] [address]  [phone]             Routing refill request to provider for review/approval because:

## 2023-11-17 ENCOUNTER — TRANSFERRED RECORDS (OUTPATIENT)
Dept: HEALTH INFORMATION MANAGEMENT | Facility: CLINIC | Age: 58
End: 2023-11-17

## 2023-12-13 DIAGNOSIS — R73.01 IMPAIRED FASTING GLUCOSE: ICD-10-CM

## 2023-12-13 NOTE — TELEPHONE ENCOUNTER
Ozempic 0.25 OR 0.5 MG/DOSE, 2 MG/3ML pen       Last Written Prescription Date:  11-13-23  Last Fill Quantity: 3 ML,   # refills: 0  Last Office Visit: 10-16-23  Future Office visit:    Next 5 appointments (look out 90 days)      Dec 26, 2023  1:15 PM  (Arrive by 1:00 PM)  Nurse Only with [facility] FP NURSE  [facility] [place] ([facility] ) [address]  [place] [address]  [phone]             R

## 2023-12-17 RX ORDER — SEMAGLUTIDE 0.68 MG/ML
0.5 INJECTION, SOLUTION SUBCUTANEOUS
Qty: 3 ML | Refills: 0 | Status: SHIPPED | OUTPATIENT
Start: 2023-12-17 | End: 2024-05-21

## 2023-12-20 ENCOUNTER — OFFICE VISIT (OUTPATIENT)
Dept: INTERNAL MEDICINE | Facility: OTHER | Age: 58
End: 2023-12-20
Attending: INTERNAL MEDICINE
Payer: COMMERCIAL

## 2023-12-20 ENCOUNTER — TELEPHONE (OUTPATIENT)
Dept: FAMILY MEDICINE | Facility: OTHER | Age: 58
End: 2023-12-20

## 2023-12-20 VITALS
OXYGEN SATURATION: 99 % | HEART RATE: 67 BPM | RESPIRATION RATE: 18 BRPM | DIASTOLIC BLOOD PRESSURE: 78 MMHG | WEIGHT: 160.1 LBS | TEMPERATURE: 96.8 F | SYSTOLIC BLOOD PRESSURE: 112 MMHG | BODY MASS INDEX: 25.84 KG/M2

## 2023-12-20 DIAGNOSIS — R73.03 PRE-DIABETES: ICD-10-CM

## 2023-12-20 DIAGNOSIS — E03.9 ACQUIRED HYPOTHYROIDISM: ICD-10-CM

## 2023-12-20 DIAGNOSIS — E78.5 HYPERLIPIDEMIA LDL GOAL <100: Primary | ICD-10-CM

## 2023-12-20 DIAGNOSIS — K59.01 SLOW TRANSIT CONSTIPATION: ICD-10-CM

## 2023-12-20 LAB
ANION GAP SERPL CALCULATED.3IONS-SCNC: 8 MMOL/L (ref 7–15)
BUN SERPL-MCNC: 11.2 MG/DL (ref 8–23)
CALCIUM SERPL-MCNC: 9.8 MG/DL (ref 8.6–10)
CHLORIDE SERPL-SCNC: 102 MMOL/L (ref 98–107)
CREAT SERPL-MCNC: 0.64 MG/DL (ref 0.51–0.95)
DEPRECATED HCO3 PLAS-SCNC: 27 MMOL/L (ref 22–29)
EGFRCR SERPLBLD CKD-EPI 2021: >90 ML/MIN/1.73M2
GLUCOSE SERPL-MCNC: 81 MG/DL (ref 70–99)
HOLD SPECIMEN: NORMAL
POTASSIUM SERPL-SCNC: 4.3 MMOL/L (ref 3.4–5.3)
SODIUM SERPL-SCNC: 137 MMOL/L (ref 135–145)
T4 FREE SERPL-MCNC: 1.83 NG/DL (ref 0.9–1.7)
TSH SERPL DL<=0.005 MIU/L-ACNC: 0.04 UIU/ML (ref 0.3–4.2)

## 2023-12-20 PROCEDURE — 99214 OFFICE O/P EST MOD 30 MIN: CPT | Performed by: INTERNAL MEDICINE

## 2023-12-20 PROCEDURE — 36415 COLL VENOUS BLD VENIPUNCTURE: CPT | Performed by: INTERNAL MEDICINE

## 2023-12-20 PROCEDURE — 80048 BASIC METABOLIC PNL TOTAL CA: CPT | Performed by: INTERNAL MEDICINE

## 2023-12-20 PROCEDURE — 84439 ASSAY OF FREE THYROXINE: CPT | Performed by: INTERNAL MEDICINE

## 2023-12-20 PROCEDURE — 84443 ASSAY THYROID STIM HORMONE: CPT | Performed by: INTERNAL MEDICINE

## 2023-12-20 RX ORDER — ROSUVASTATIN CALCIUM 10 MG/1
10 TABLET, COATED ORAL DAILY
Qty: 90 TABLET | Refills: 3 | Status: SHIPPED | OUTPATIENT
Start: 2023-12-20

## 2023-12-20 RX ORDER — SENNOSIDES A AND B 8.6 MG/1
1 TABLET, FILM COATED ORAL 2 TIMES DAILY
Qty: 180 TABLET | Refills: 3 | Status: SHIPPED | OUTPATIENT
Start: 2023-12-20 | End: 2023-12-26

## 2023-12-20 RX ORDER — LEVOTHYROXINE SODIUM 112 UG/1
112 TABLET ORAL DAILY
Qty: 90 TABLET | Refills: 3 | Status: SHIPPED | OUTPATIENT
Start: 2023-12-20 | End: 2023-12-20

## 2023-12-20 ASSESSMENT — PAIN SCALES - GENERAL: PAINLEVEL: NO PAIN (0)

## 2023-12-20 NOTE — TELEPHONE ENCOUNTER
Dose change levothyroxine per Dr. Peck after labs on 12/20/23.      Patient is currently taking levothyroxine 100 mcg, alternating with 125 mcg every other day per Dr. Peck.

## 2023-12-20 NOTE — PROGRESS NOTES
Assessment & Plan   Problem List Items Addressed This Visit    None  Visit Diagnoses       Hyperlipidemia LDL goal <100    -  Primary    Relevant Medications    rosuvastatin (CRESTOR) 10 MG tablet    Pre-diabetes        Relevant Medications    metFORMIN (GLUCOPHAGE) 500 MG tablet    Semaglutide, 1 MG/DOSE, (OZEMPIC) 4 MG/3ML pen    Other Relevant Orders    Basic metabolic panel    Acquired hypothyroidism        Relevant Orders    TSH    T4, free    Slow transit constipation        Relevant Medications    senna (SENOKOT) 8.6 MG tablet             Review of prior external note(s) from - Outside records from [facility].  25 minutes spent by me on the date of the encounter doing chart review, review of outside records, review of test results, interpretation of tests, patient visit, and documentation            No follow-ups on file.    Brock Peck, [facility]    Jordan Batres is a 58 year old, presenting for the following health issues:  Hyperlipidemia, Diabetes, and Thyroid Problem      NOHEMI Batres presents today for follow-up.  She has a history of prediabetes and has been on Ozempic for several months.  Since May 2023 she has lost approximately 7 pounds.  She was previously on Rybelsus which left her with the side effect of severe constipation.  With the Ozempic she denies any significant constipation.  She also has a history of hypothyroidism.  Last time we did check her labs they were slightly off hence we will recheck her TSH and T4 today which she is agreeable to.  We will also do a BMP to assess kidney function in the context of recent kidney stones stent placement and Ozempic use.    We did discuss vaccinations today including shingles vaccine and influenza.  She did decline both these vaccinations today.    In addition patient does need refills of her medications today.  She would like to try going up on her Ozempic to 1 mg.  She is not yet due for an  A1c.      Pre-Diabetes Follow-up    How often are you checking your blood sugar? Not at all  What concerns do you have today about your diabetes? None and Other: Discussing dosage Ozempic vs change to Mounjaro   Do you have any of these symptoms? (Select all that apply)  No numbness or tingling in feet.  No redness, sores or blisters on feet.  No complaints of excessive thirst.  No reports of blurry vision.  No significant changes to weight.      BP Readings from Last 2 Encounters:   12/20/23 112/78   10/16/23 118/72     Hemoglobin A1C (%)   Date Value   10/16/2023 5.8 (H)   05/25/2023 6.0 (H)     LDL Cholesterol Calculated (mg/dL)   Date Value   02/21/2023 85             Hyperlipidemia Follow-Up    Are you regularly taking any medication or supplement to lower your cholesterol?   Yes- ROSUVASTATIN   Are you having muscle aches or other side effects that you think could be caused by your cholesterol lowering medication?  No    Hypothyroidism Follow-up    Since last visit, patient describes the following symptoms: Weight stable, no hair loss, no skin changes, no constipation, no loose stools      Review of Systems   Constitutional, HEENT, cardiovascular, pulmonary, gi and gu systems are negative, except as otherwise noted.      Objective    /78 (BP Location: Left arm, Patient Position: Sitting, Cuff Size: Adult Regular)   Pulse 67   Temp 96.8  F (36  C) (Tympanic)   Resp 18   Wt 72.6 kg (160 lb 1.6 oz)   SpO2 99%   BMI 25.84 kg/m    Body mass index is 25.84 kg/m .  Physical Exam   GENERAL: healthy, alert and no distress  PSYCH: mentation appears normal, affect normal/bright    Office Visit on 10/16/2023   Component Date Value Ref Range Status    Ventricular Rate 10/16/2023 61  BPM Incomplete    Atrial Rate 10/16/2023 61  BPM Incomplete    UT Interval 10/16/2023 194  ms Incomplete    QRS Duration 10/16/2023 84  ms Incomplete    QT 10/16/2023 428  ms Incomplete    QTc 10/16/2023 430  ms Incomplete    P Axis  10/16/2023 66  degrees Incomplete    R AXIS 10/16/2023 21  degrees Incomplete    T Axis 10/16/2023 26  degrees Incomplete    Interpretation ECG 10/16/2023    Incomplete                    Value:Sinus rhythm  Normal ECG  No previous ECGs available      Sodium 10/16/2023 140  135 - 145 mmol/L Final    Reference intervals for this test were updated on 09/26/2023 to more accurately reflect our healthy population. There may be differences in the flagging of prior results with similar values performed with this method. Interpretation of those prior results can be made in the context of the updated reference intervals.     Potassium 10/16/2023 4.1  3.4 - 5.3 mmol/L Final    Carbon Dioxide (CO2) 10/16/2023 25  22 - 29 mmol/L Final    Anion Gap 10/16/2023 12  7 - 15 mmol/L Final    Urea Nitrogen 10/16/2023 16.0  8.0 - 23.0 mg/dL Final    Creatinine 10/16/2023 0.73  0.51 - 0.95 mg/dL Final    GFR Estimate 10/16/2023 >90  >60 mL/min/1.73m2 Final    Calcium 10/16/2023 9.4  8.6 - 10.0 mg/dL Final    Chloride 10/16/2023 103  98 - 107 mmol/L Final    Glucose 10/16/2023 90  70 - 99 mg/dL Final    Alkaline Phosphatase 10/16/2023 46  35 - 104 U/L Final    AST 10/16/2023 23  0 - 45 U/L Final    Reference intervals for this test were updated on 6/12/2023 to more accurately reflect our healthy population. There may be differences in the flagging of prior results with similar values performed with this method. Interpretation of those prior results can be made in the context of the updated reference intervals.    ALT 10/16/2023 37  0 - 50 U/L Final    Reference intervals for this test were updated on 6/12/2023 to more accurately reflect our healthy population. There may be differences in the flagging of prior results with similar values performed with this method. Interpretation of those prior results can be made in the context of the updated reference intervals.      Protein Total 10/16/2023 7.3  6.4 - 8.3 g/dL Final    Albumin  10/16/2023 4.6  3.5 - 5.2 g/dL Final    Bilirubin Total 10/16/2023 0.3  <=1.2 mg/dL Final    TSH 10/16/2023 0.12 (L)  0.30 - 4.20 uIU/mL Final    Free T4 10/16/2023 1.73 (H)  0.90 - 1.70 ng/dL Final    Estimated Average Glucose 10/16/2023 120  mg/dL Final    Hemoglobin A1C 10/16/2023 5.8 (H)  <5.7 % Final    Normal <5.7%   Prediabetes 5.7-6.4%    Diabetes 6.5% or higher     Note: Adopted from ADA consensus guidelines.    WBC Count 10/16/2023 4.1  4.0 - 11.0 10e3/uL Final    RBC Count 10/16/2023 4.63  3.80 - 5.20 10e6/uL Final    Hemoglobin 10/16/2023 13.4  11.7 - 15.7 g/dL Final    Hematocrit 10/16/2023 39.5  35.0 - 47.0 % Final    MCV 10/16/2023 85  78 - 100 fL Final    MCH 10/16/2023 28.9  26.5 - 33.0 pg Final    MCHC 10/16/2023 33.9  31.5 - 36.5 g/dL Final    RDW 10/16/2023 12.2  10.0 - 15.0 % Final    Platelet Count 10/16/2023 263  150 - 450 10e3/uL Final    % Neutrophils 10/16/2023 51  % Final    % Lymphocytes 10/16/2023 37  % Final    % Monocytes 10/16/2023 10  % Final    % Eosinophils 10/16/2023 2  % Final    % Basophils 10/16/2023 0  % Final    % Immature Granulocytes 10/16/2023 0  % Final    Absolute Neutrophils 10/16/2023 2.1  1.6 - 8.3 10e3/uL Final    Absolute Lymphocytes 10/16/2023 1.5  0.8 - 5.3 10e3/uL Final    Absolute Monocytes 10/16/2023 0.4  0.0 - 1.3 10e3/uL Final    Absolute Eosinophils 10/16/2023 0.1  0.0 - 0.7 10e3/uL Final    Absolute Basophils 10/16/2023 0.0  0.0 - 0.2 10e3/uL Final    Absolute Immature Granulocytes 10/16/2023 0.0  <=0.4 10e3/uL Final     Results for orders placed or performed in visit on 12/20/23   Extra Tube     Status: None ()    Narrative    The following orders were created for panel order Extra Tube.  Procedure                               Abnormality         Status                     ---------                               -----------         ------                     Extra Purple Top Tube[[id number]]                                                         Please  view results for these tests on the individual orders.     Results for orders placed or performed in visit on 12/20/23 (from the past 24 hour(s))   Extra Tube    Narrative    The following orders were created for panel order Extra Tube.  Procedure                               Abnormality         Status                     ---------                               -----------         ------                     Extra Purple Top Tube[[id number]]                                                         Please view results for these tests on the individual orders.

## 2023-12-21 DIAGNOSIS — R73.03 PRE-DIABETES: Primary | ICD-10-CM

## 2023-12-21 RX ORDER — METFORMIN HCL 500 MG
500 TABLET, EXTENDED RELEASE 24 HR ORAL 2 TIMES DAILY WITH MEALS
Qty: 180 TABLET | Refills: 3 | Status: SHIPPED | OUTPATIENT
Start: 2023-12-21

## 2023-12-22 ENCOUNTER — MYC MEDICAL ADVICE (OUTPATIENT)
Dept: INTERNAL MEDICINE | Facility: OTHER | Age: 58
End: 2023-12-22

## 2023-12-22 DIAGNOSIS — E03.9 ACQUIRED HYPOTHYROIDISM: Primary | ICD-10-CM

## 2023-12-22 NOTE — PROGRESS NOTES
Hepatitis B- third in series  Earliest due: 10/16/23    Adacel given 10/12/20- Not due for 10 years    Reviewed [facility]  Due for second Shingrix vaccine    Prior to immunization administration, verified patients identity using patient s name and date of birth. Please see Immunization Activity for additional information.     Screening Questionnaire for Adult Immunization    Are you sick today?   No   Do you have allergies to medications, food, a vaccine component or latex?   No   Have you ever had a serious reaction after receiving a vaccination?   No   Do you have a long-term health problem with heart, lung, kidney, or metabolic disease (e.g., diabetes), asthma, a blood disorder, no spleen, complement component deficiency, a cochlear implant, or a spinal fluid leak?  Are you on long-term aspirin therapy?   No   Do you have cancer, leukemia, HIV/AIDS, or any other immune system problem?   No   Do you have a parent, brother, or sister with an immune system problem?   No   In the past 3 months, have you taken medications that affect  your immune system, such as prednisone, other steroids, or anticancer drugs; drugs for the treatment of rheumatoid arthritis, Crohn s disease, or psoriasis; or have you had radiation treatments?   No   Have you had a seizure, or a brain or other nervous system problem?   No   During the past year, have you received a transfusion of blood or blood    products, or been given immune (gamma) globulin or antiviral drug?   No   For women: Are you pregnant or is there a chance you could become       pregnant during the next month?   No   Have you received any vaccinations in the past 4 weeks?   No     Immunization questionnaire answers were all negative.    I have reviewed the following standing orders:   This patient is due and qualifies for the Hepatitis B vaccine.    Click here for Hepatitis B Standing Order    I have reviewed the vaccines inclusion and exclusion criteria; No concerns regarding  eligibility.         This patient is due and qualifies for the Zoster vaccine.    Click here for Zoster Standing Order    I have reviewed the vaccines inclusion and exclusion criteria; No concerns regarding eligibility.     Patient instructed to remain in clinic for 15 minutes afterwards, and to report any adverse reactions.     Screening performed by Thalia Oates RN on 12/26/2023 at 1:19 PM.

## 2023-12-26 ENCOUNTER — ALLIED HEALTH/NURSE VISIT (OUTPATIENT)
Dept: FAMILY MEDICINE | Facility: OTHER | Age: 58
End: 2023-12-26
Attending: INTERNAL MEDICINE
Payer: COMMERCIAL

## 2023-12-26 DIAGNOSIS — B02.9 SHINGLES: ICD-10-CM

## 2023-12-26 DIAGNOSIS — Z23 NEED FOR SHINGLES VACCINE: ICD-10-CM

## 2023-12-26 DIAGNOSIS — Z23 NEED FOR HEPATITIS VACCINATION: Primary | ICD-10-CM

## 2023-12-26 DIAGNOSIS — K59.01 SLOW TRANSIT CONSTIPATION: ICD-10-CM

## 2023-12-26 PROCEDURE — 90472 IMMUNIZATION ADMIN EACH ADD: CPT

## 2023-12-26 PROCEDURE — 90750 HZV VACC RECOMBINANT IM: CPT

## 2023-12-26 PROCEDURE — 90471 IMMUNIZATION ADMIN: CPT

## 2023-12-26 PROCEDURE — 90746 HEPB VACCINE 3 DOSE ADULT IM: CPT

## 2023-12-26 RX ORDER — LEVOTHYROXINE SODIUM 112 UG/1
TABLET ORAL
Qty: 45 TABLET | Refills: 1 | Status: SHIPPED | OUTPATIENT
Start: 2023-12-26 | End: 2024-09-17

## 2023-12-26 RX ORDER — SENNOSIDES A AND B 8.6 MG/1
1 TABLET, FILM COATED ORAL 2 TIMES DAILY
Qty: 180 TABLET | Refills: 3 | Status: SHIPPED | OUTPATIENT
Start: 2023-12-26

## 2023-12-26 RX ORDER — LEVOTHYROXINE SODIUM 100 UG/1
TABLET ORAL
Qty: 45 TABLET | Refills: 1 | Status: SHIPPED | OUTPATIENT
Start: 2023-12-26 | End: 2024-09-16

## 2023-12-26 NOTE — TELEPHONE ENCOUNTER
Pt would like to have sent to [facility].  Was originally sent to [facility] and her insurance will not cover it through [facility].

## 2023-12-26 NOTE — TELEPHONE ENCOUNTER
Pt called and would like to alternate the 100 mcg and the 112 mcg.   See lab note from PCP on 12/20/2023.    Orders need to be sent to [facility].    Pended up levothyroxine 112 mcg and levothyroxine 100 mcg to alternate every other day.  Please review and sign if appropriate.    Please advise.  PCP out of clinic.

## 2024-04-15 DIAGNOSIS — R73.03 PRE-DIABETES: ICD-10-CM

## 2024-04-15 PROBLEM — L21.9 SEBORRHEIC DERMATITIS: Status: ACTIVE | Noted: 2019-01-08

## 2024-04-15 PROBLEM — N20.0 NEPHROLITHIASIS: Status: ACTIVE | Noted: 2023-10-13

## 2024-04-15 NOTE — TELEPHONE ENCOUNTER
Ozempic  Last Written Prescription Date: 12/17/23  Last Fill Quantity: 3 ml # of Refills: 0  Last Office Visit: 12/20/23

## 2024-04-16 RX ORDER — SEMAGLUTIDE 1.34 MG/ML
1 INJECTION, SOLUTION SUBCUTANEOUS
Qty: 3 ML | Refills: 3 | Status: SHIPPED | OUTPATIENT
Start: 2024-04-16 | End: 2024-07-10

## 2024-04-16 NOTE — TELEPHONE ENCOUNTER
GLP-1 Agonists Protocol [id number]/15/2024 09:09 AM   Protocol Details HgbA1C in past 3 or 6 months    Medication indicated for associated diagnosis       Hemoglobin A1C   Date Value Ref Range Status   10/16/2023 5.8 (H) <5.7 % Final     Comment:     Normal <5.7%   Prediabetes 5.7-6.4%    Diabetes 6.5% or higher     Note: Adopted from ADA consensus guidelines.

## 2024-04-18 ENCOUNTER — TELEPHONE (OUTPATIENT)
Dept: INTERNAL MEDICINE | Facility: OTHER | Age: 59
End: 2024-04-18

## 2024-04-18 ENCOUNTER — OFFICE VISIT (OUTPATIENT)
Dept: INTERNAL MEDICINE | Facility: OTHER | Age: 59
End: 2024-04-18
Attending: INTERNAL MEDICINE
Payer: COMMERCIAL

## 2024-04-18 VITALS
OXYGEN SATURATION: 96 % | TEMPERATURE: 97.9 F | DIASTOLIC BLOOD PRESSURE: 78 MMHG | BODY MASS INDEX: 26.02 KG/M2 | SYSTOLIC BLOOD PRESSURE: 112 MMHG | WEIGHT: 161.2 LBS | RESPIRATION RATE: 20 BRPM | HEART RATE: 76 BPM

## 2024-04-18 DIAGNOSIS — R35.0 URINARY FREQUENCY: Primary | ICD-10-CM

## 2024-04-18 DIAGNOSIS — N30.00 ACUTE CYSTITIS WITHOUT HEMATURIA: ICD-10-CM

## 2024-04-18 LAB
ALBUMIN UR-MCNC: ABNORMAL MG/DL
APPEARANCE UR: CLEAR
BACTERIA #/AREA URNS HPF: ABNORMAL /HPF
BILIRUB UR QL STRIP: NEGATIVE
COLOR UR AUTO: YELLOW
GLUCOSE UR STRIP-MCNC: NEGATIVE MG/DL
HGB UR QL STRIP: NEGATIVE
KETONES UR STRIP-MCNC: ABNORMAL MG/DL
LEUKOCYTE ESTERASE UR QL STRIP: ABNORMAL
NITRATE UR QL: POSITIVE
PH UR STRIP: 6 [PH] (ref 5–7)
RBC #/AREA URNS AUTO: ABNORMAL /HPF
SP GR UR STRIP: 1.02 (ref 1–1.03)
SQUAMOUS #/AREA URNS AUTO: ABNORMAL /LPF
UROBILINOGEN UR STRIP-ACNC: 0.2 E.U./DL
WBC #/AREA URNS AUTO: ABNORMAL /HPF

## 2024-04-18 PROCEDURE — 81001 URINALYSIS AUTO W/SCOPE: CPT | Performed by: INTERNAL MEDICINE

## 2024-04-18 PROCEDURE — 87186 SC STD MICRODIL/AGAR DIL: CPT | Performed by: INTERNAL MEDICINE

## 2024-04-18 PROCEDURE — 99213 OFFICE O/P EST LOW 20 MIN: CPT | Performed by: INTERNAL MEDICINE

## 2024-04-18 PROCEDURE — 87086 URINE CULTURE/COLONY COUNT: CPT | Performed by: INTERNAL MEDICINE

## 2024-04-18 RX ORDER — CIPROFLOXACIN 250 MG/1
250 TABLET, FILM COATED ORAL 2 TIMES DAILY
Qty: 10 TABLET | Refills: 0 | Status: SHIPPED | OUTPATIENT
Start: 2024-04-18 | End: 2024-05-21

## 2024-04-18 NOTE — TELEPHONE ENCOUNTER
Call returned to patient, advised Urgent Care for Urinary Symptoms per Dr. Peck, due to schedule and meetings today, no openings with covering providers.     Patient verbalized understanding.

## 2024-04-18 NOTE — TELEPHONE ENCOUNTER
Call returned to patient, notified Dr. Peck is able to see patient now if patient is able to come to clinic as soon as possible.     Patient reports she is able to come now.     Appt scheduled:     Next 5 appointments (look out 90 days)      Apr 18, 2024 10:30 AM  (Arrive by 10:15 AM)  Provider Visit with Brock Peck,   [facility] [address]  [facility] [id number] [phone]

## 2024-04-18 NOTE — TELEPHONE ENCOUNTER
8:03 AM    Reason for Call: OVERBOOK    Patient is having the following symptoms:  for  possible UTI.    The patient is requesting an appointment for  with [employer].    Was an appointment offered for this call? No  If yes : Appointment type              Date    Preferred method for responding to this message: Telephone Call  What is your phone number ? [phone]    If we cannot reach you directly, may we leave a detailed response at the number you provided? Yes    Can this message wait until your PCP/provider returns, if unavailable today? YES, provider is in today    JUANJOSE RIVERA

## 2024-04-18 NOTE — PROGRESS NOTES
Assessment & Plan   Problem List Items Addressed This Visit    None  Visit Diagnoses       Urinary frequency    -  Primary    Relevant Orders    UA with Microscopic reflex to Culture - [facility] (Completed)    Urine Microscopic Exam (Completed)    Urine Culture    Acute cystitis without hematuria        Relevant Medications    ciprofloxacin (CIPRO) 250 MG tablet BID x 5 days               15 minutes spent by me on the date of the encounter doing chart review, interpretation of tests, patient visit, and documentation          No follow-ups on file.      Jordan Batres is a 58 year old, presenting for the following health issues:  Urinary Problem    History of Present Illness       Reason for visit:  Fever  Symptom onset:  3-7 days ago    She eats 2-3 servings of fruits and vegetables daily.She consumes 0 sweetened beverage(s) daily.She exercises with enough effort to increase her heart rate 20 to 29 minutes per day.  She exercises with enough effort to increase her heart rate 3 or less days per week.   She is taking medications regularly.       Gisel presents with 1 week of symptoms including cloudy urine fevers and chills dysuria and frequency.  She states that she has been chilled since Saturday or Sunday of last week.  She denies any hematuria.    Genitourinary - Female  Onset/Duration: X 1 WEEK   Description:   Painful urination (Dysuria): YES- BURNING UPON URINATION            Frequency: YES  Blood in urine (Hematuria): No  Delay in urine (Hesitency): No  Intensity: mild  Progression of Symptoms:  worsening  Accompanying Signs & Symptoms:  Fever/chills: YES- FEVER AND CHILLS   Flank pain: No  Nausea and vomiting: YES- NAUSEA  Vaginal symptoms: none  Abdominal/Pelvic Pain: No  History:   History of frequent UTI s: No  History of kidney stones: YES  Sexually Active: No  Possibility of pregnancy: No  Precipitating or alleviating factors: None  Therapies tried and outcome: OTC advil or tylenol              Review of Systems  Constitutional, HEENT, cardiovascular, pulmonary, gi and gu systems are negative, except as otherwise noted.      Objective    /78 (BP Location: Left arm, Patient Position: Sitting, Cuff Size: Adult Regular)   Pulse 76   Temp 97.9  F (36.6  C) (Tympanic)   Resp 20   Wt 73.1 kg (161 lb 3.2 oz)   SpO2 96%   BMI 26.02 kg/m    Body mass index is 26.02 kg/m .  Physical Exam   GENERAL: alert and no distress  PSYCH: mentation appears normal, affect normal/bright    Office Visit on 12/20/2023   Component Date Value Ref Range Status    Sodium 12/20/2023 137  135 - 145 mmol/L Final    Reference intervals for this test were updated on 09/26/2023 to more accurately reflect our healthy population. There may be differences in the flagging of prior results with similar values performed with this method. Interpretation of those prior results can be made in the context of the updated reference intervals.     Potassium 12/20/2023 4.3  3.4 - 5.3 mmol/L Final    Chloride 12/20/2023 102  98 - 107 mmol/L Final    Carbon Dioxide (CO2) 12/20/2023 27  22 - 29 mmol/L Final    Anion Gap 12/20/2023 8  7 - 15 mmol/L Final    Urea Nitrogen 12/20/2023 11.2  8.0 - 23.0 mg/dL Final    Creatinine 12/20/2023 0.64  0.51 - 0.95 mg/dL Final    GFR Estimate 12/20/2023 >90  >60 mL/min/1.73m2 Final    Calcium 12/20/2023 9.8  8.6 - 10.0 mg/dL Final    Glucose 12/20/2023 81  70 - 99 mg/dL Final    TSH 12/20/2023 0.04 (L)  0.30 - 4.20 uIU/mL Final    Free T4 12/20/2023 1.83 (H)  0.90 - 1.70 ng/dL Final    Hold Specimen 12/20/2023 [facility]   Final     Results for orders placed or performed in visit on 04/18/24   UA with Microscopic reflex to Culture - [facility]     Status: Abnormal    Specimen: Urine, Midstream   Result Value Ref Range    Color Urine Yellow Colorless, Straw, Light Yellow, Yellow    Appearance Urine Clear Clear    Glucose Urine Negative Negative mg/dL    Bilirubin Urine Negative Negative    Ketones  Urine Trace (A) Negative mg/dL    Specific Gravity Urine 1.025 1.003 - 1.035    Blood Urine Negative Negative    pH Urine 6.0 5.0 - 7.0    Protein Albumin Urine Trace (A) Negative mg/dL    Urobilinogen Urine 0.2 0.2, 1.0 E.U./dL    Nitrite Urine Positive (A) Negative    Leukocyte Esterase Urine Moderate (A) Negative   Urine Microscopic Exam     Status: Abnormal   Result Value Ref Range    Bacteria Urine Many (A) None Seen /HPF    RBC Urine 0-2 0-2 /HPF /HPF    WBC Urine 10-25 (A) 0-5 /HPF /HPF    Squamous Epithelials Urine Few (A) None Seen /LPF     Results for orders placed or performed in visit on 04/18/24 (from the past 24 hour(s))   UA with Microscopic reflex to Culture - [facility]    Specimen: Urine, Midstream   Result Value Ref Range    Color Urine Yellow Colorless, Straw, Light Yellow, Yellow    Appearance Urine Clear Clear    Glucose Urine Negative Negative mg/dL    Bilirubin Urine Negative Negative    Ketones Urine Trace (A) Negative mg/dL    Specific Gravity Urine 1.025 1.003 - 1.035    Blood Urine Negative Negative    pH Urine 6.0 5.0 - 7.0    Protein Albumin Urine Trace (A) Negative mg/dL    Urobilinogen Urine 0.2 0.2, 1.0 E.U./dL    Nitrite Urine Positive (A) Negative    Leukocyte Esterase Urine Moderate (A) Negative   Urine Microscopic Exam   Result Value Ref Range    Bacteria Urine Many (A) None Seen /HPF    RBC Urine 0-2 0-2 /HPF /HPF    WBC Urine 10-25 (A) 0-5 /HPF /HPF    Squamous Epithelials Urine Few (A) None Seen /LPF           Signed Electronically by: Brock Peck DO

## 2024-04-22 ENCOUNTER — TELEPHONE (OUTPATIENT)
Dept: INTERNAL MEDICINE | Facility: OTHER | Age: 59
End: 2024-04-22

## 2024-04-22 LAB — BACTERIA UR CULT: ABNORMAL

## 2024-04-22 NOTE — TELEPHONE ENCOUNTER
Connie from Hospital lab is calling to make sure the patient is going to be put on a medication  for her Urine culture report. With Dr. Peck out of office wants to make sure it gets addressed. Please call her back at ext [phone] or [phone].

## 2024-04-22 NOTE — TELEPHONE ENCOUNTER
Call placed to [facility], update provided patient has been prescribed Cipro.     Update received patient will be on Contact Precautions List due to ESBL.

## 2024-05-21 ENCOUNTER — OFFICE VISIT (OUTPATIENT)
Dept: FAMILY MEDICINE | Facility: OTHER | Age: 59
End: 2024-05-21
Attending: NURSE PRACTITIONER
Payer: COMMERCIAL

## 2024-05-21 VITALS
RESPIRATION RATE: 20 BRPM | TEMPERATURE: 98.4 F | SYSTOLIC BLOOD PRESSURE: 112 MMHG | WEIGHT: 165 LBS | BODY MASS INDEX: 26.63 KG/M2 | DIASTOLIC BLOOD PRESSURE: 70 MMHG | OXYGEN SATURATION: 98 % | HEART RATE: 85 BPM

## 2024-05-21 DIAGNOSIS — R30.0 DYSURIA: Primary | ICD-10-CM

## 2024-05-21 DIAGNOSIS — Z87.442 HISTORY OF NEPHROLITHIASIS: ICD-10-CM

## 2024-05-21 LAB
ALBUMIN UR-MCNC: NEGATIVE MG/DL
AMORPH CRY #/AREA URNS HPF: ABNORMAL /HPF
APPEARANCE UR: ABNORMAL
BACTERIA #/AREA URNS HPF: ABNORMAL /HPF
BILIRUB UR QL STRIP: NEGATIVE
COLOR UR AUTO: YELLOW
GLUCOSE UR STRIP-MCNC: NEGATIVE MG/DL
HGB UR QL STRIP: NEGATIVE
KETONES UR STRIP-MCNC: NEGATIVE MG/DL
LEUKOCYTE ESTERASE UR QL STRIP: NEGATIVE
NITRATE UR QL: NEGATIVE
PH UR STRIP: 7 [PH] (ref 5–7)
RBC #/AREA URNS AUTO: ABNORMAL /HPF
SP GR UR STRIP: 1.02 (ref 1–1.03)
SQUAMOUS #/AREA URNS AUTO: ABNORMAL /LPF
UROBILINOGEN UR STRIP-ACNC: 0.2 E.U./DL
WBC #/AREA URNS AUTO: ABNORMAL /HPF

## 2024-05-21 PROCEDURE — 99213 OFFICE O/P EST LOW 20 MIN: CPT | Performed by: NURSE PRACTITIONER

## 2024-05-21 PROCEDURE — 81001 URINALYSIS AUTO W/SCOPE: CPT | Performed by: NURSE PRACTITIONER

## 2024-05-21 PROCEDURE — 87086 URINE CULTURE/COLONY COUNT: CPT | Performed by: NURSE PRACTITIONER

## 2024-05-21 ASSESSMENT — PAIN SCALES - GENERAL: PAINLEVEL: MODERATE PAIN (4)

## 2024-05-21 NOTE — PATIENT INSTRUCTIONS
Assessment & Plan       Dysuria  - UA Macroscopic with reflex to Microscopic and Culture  - UA Microscopic with Reflex to Culture  - CT Abdomen Pelvis w/o & w Contrast; Future  - Urine Culture; Future      History of nephrolithiasis  - CT Abdomen Pelvis w/o & w Contrast; Future        Please continue to take all medication as directed  Please notify your pharmacy or our refill line of future refills needed.  Please return sooner than your next scheduled appointment with any concerns.      When your lab results return, we will call you with abnormal findings  You can also view this information in your MyChart, if you have an account.  Please call or Victorioushart message us with any questions you may have.          To UC or ER as needed  Increase fluid intake        Meagan GARCIA  [phone]

## 2024-05-21 NOTE — PROGRESS NOTES
Assessment & Plan       Dysuria  - UA Macroscopic with reflex to Microscopic and Culture  - UA Microscopic with Reflex to Culture  - CT Abdomen Pelvis w/o & w Contrast; Future  - Urine Culture; Future      History of nephrolithiasis  - CT Abdomen Pelvis w/o & w Contrast; Future        Please continue to take all medication as directed  Please notify your pharmacy or our refill line of future refills needed.  Please return sooner than your next scheduled appointment with any concerns.      When your lab results return, we will call you with abnormal findings  You can also view this information in your MyChart, if you have an account.  Please call or Reclog message us with any questions you may have.          To UC or ER as needed  Increase fluid intake        Meagan Gunderson [facility]  [phone]           Gisel is a 58 year old, presenting for the following health issues:  Urinary Problem        Genitourinary - Female  Onset/Duration: 4-5 days  Description:   Painful urination (Dysuria): YES           Frequency: YES  Blood in urine (Hematuria): No  Delay in urine (Hesitency): No  Intensity: mild  Progression of Symptoms:  worsening  Accompanying Signs & Symptoms:  Fever/chills: YES  Flank pain: YES  Nausea and vomiting: No  Vaginal symptoms: none  Abdominal/Pelvic Pain: YES  History:   History of frequent UTI s: No  History of kidney stones: YES  Sexually Active: YES  Possibility of pregnancy: No  Precipitating or alleviating factors: None  Therapies tried and outcome: Increase fluid intake        Patient Active Problem List   Diagnosis    Alopecia    Dysplastic nevi    Epiphora    History of dysplastic nevus    Hyperlipidemia    Nephrolithiasis    Other psoriasis    Rosacea    Seborrheic dermatitis    Thyrotoxic exophthalmos    Vitamin D deficiency     No past surgical history on file.    Social History     Tobacco Use    Smoking status: Never    Smokeless tobacco: Never   Substance Use Topics    Alcohol use: Not on  file     Family History   Problem Relation Age of Onset    Heart Disease Mother     Pancreatic Cancer Father              Current Outpatient Medications   Medication Sig Dispense Refill    levothyroxine (SYNTHROID/LEVOTHROID) 100 MCG tablet Alternate every other day with the 112 mcg dose. 45 tablet 1    levothyroxine (SYNTHROID/LEVOTHROID) 112 MCG tablet Alternate every other day with the 100 mcg dose. 45 tablet 1    metFORMIN (GLUCOPHAGE XR) 500 MG 24 hr tablet Take 1 tablet (500 mg) by mouth 2 times daily (with meals) 180 tablet 3    OZEMPIC, 1 MG/DOSE, 4 MG/3ML pen INJECT 1 MG SUBCUTANEOUS EVERY 7 DAYS 3 mL 3    rosuvastatin (CRESTOR) 10 MG tablet Take 1 tablet (10 mg) by mouth daily 90 tablet 3    senna (SENOKOT) 8.6 MG tablet Take 1 tablet by mouth 2 times daily 180 tablet 3         Allergies   Allergen Reactions    Asa [Aspirin] Hives    Ivp Dye [Contrast Dye] Hives    Pcn [Penicillins] Hives    Sulfa Antibiotics Hives         Recent Labs   Lab Test 12/20/23  1109 10/16/23  0817 06/26/23  1302 05/25/23  0834 02/21/23  0955 02/21/23  0955 06/02/22  1525 08/12/21  0833 12/16/18  1240   A1C  --  5.8*  --  6.0*  --  6.0*  --   --   --    LDL  --   --   --   --   --  85  --   --   --    HDL  --   --   --   --   --  49*  --   --   --    TRIG  --   --   --   --   --  159* 188* 186*  --    ALT  --  37 45 32   < > 38*  --   --  27   CR 0.64 0.73 0.69 0.72   < > 0.68  --   --  0.61   GFRESTIMATED >90 >90 >90 >90   < > >90  --   --  >90   GFRESTBLACK  --   --   --   --   --   --   --   --  >90   POTASSIUM 4.3 4.1 4.2 3.9   < > 4.3  --   --  4.0   TSH 0.04* 0.12*  --   --    < > 0.11*  --   --   --     < > = values in this interval not displayed.        BP Readings from Last 3 Encounters:   05/21/24 112/70   04/18/24 112/78   12/20/23 112/78    Wt Readings from Last 3 Encounters:   05/21/24 74.8 kg (165 lb)   04/18/24 73.1 kg (161 lb 3.2 oz)   12/20/23 72.6 kg (160 lb 1.6 oz)                   Review of  Systems  Constitutional, HEENT, cardiovascular, pulmonary, gi and gu systems are negative, except as otherwise noted.          Objective    /70 (BP Location: Right arm, Patient Position: Sitting, Cuff Size: Adult Regular)   Pulse 85   Temp 98.4  F (36.9  C) (Tympanic)   Resp 20   Wt 74.8 kg (165 lb)   SpO2 98%   BMI 26.63 kg/m    Body mass index is 26.63 kg/m .        Physical Exam   GENERAL: alert and no distress  NECK: no adenopathy, no asymmetry, masses, or scars  RESP: lungs clear to auscultation - no rales, rhonchi or wheezes  CV: regular rate and rhythm, normal S1 S2, no S3 or S4, no murmur, click or rub, no peripheral edema  ABDOMEN: soft, nontender, no hepatosplenomegaly, no masses and bowel sounds normal  SKIN: no suspicious lesions or rashes  PSYCH: mentation appears normal, affect normal/bright        Results for orders placed or performed in visit on 05/21/24   UA Macroscopic with reflex to Microscopic and Culture     Status: Abnormal    Specimen: Urine, Midstream   Result Value Ref Range    Color Urine Yellow Colorless, Straw, Light Yellow, Yellow    Appearance Urine Cloudy (A) Clear    Glucose Urine Negative Negative mg/dL    Bilirubin Urine Negative Negative    Ketones Urine Negative Negative mg/dL    Specific Gravity Urine 1.020 1.003 - 1.035    Blood Urine Negative Negative    pH Urine 7.0 5.0 - 7.0    Protein Albumin Urine Negative Negative mg/dL    Urobilinogen Urine 0.2 0.2, 1.0 E.U./dL    Nitrite Urine Negative Negative    Leukocyte Esterase Urine Negative Negative   UA Microscopic with Reflex to Culture     Status: Abnormal   Result Value Ref Range    Bacteria Urine Few (A) None Seen /HPF    RBC Urine 0-2 0-2 /HPF /HPF    WBC Urine 0-5 0-5 /HPF /HPF    Squamous Epithelials Urine Few (A) None Seen /LPF    Amorphous Crystals Urine Moderate (A) None Seen /HPF    Narrative    Urine Culture not indicated            Signed Electronically by: Meagan Gunderson CNP

## 2024-05-23 ENCOUNTER — TELEPHONE (OUTPATIENT)
Dept: INTERVENTIONAL RADIOLOGY/VASCULAR | Facility: HOSPITAL | Age: 59
End: 2024-05-23

## 2024-05-23 LAB — BACTERIA UR CULT: NORMAL

## 2024-05-23 NOTE — PROVIDER NOTIFICATION
Pt returned phone call. Stated she had a CT with contrast yesterday without problems. Pt states she is currently in the [facility] and will be canceling her 5/29/2024 scheduled CT.

## 2024-05-23 NOTE — PROVIDER NOTIFICATION
Pt is scheduled for a CT abdomen/pelvis with and without contrast. Per Radiologist DR Gaona pt th CT will be done without contrast due to pts allergy to CT contrast. In review of pt's chart it does not look like a contrast prep was ordered. Attempted to call pt to see if a prep was prescribed. Unable to reach pt to inform her contrast prep is not needed for 5/29/2024 scheduled CT. Brief message left with call back number.

## 2024-05-30 ENCOUNTER — TRANSFERRED RECORDS (OUTPATIENT)
Dept: HEALTH INFORMATION MANAGEMENT | Facility: CLINIC | Age: 59
End: 2024-05-30

## 2024-06-03 ENCOUNTER — OFFICE VISIT (OUTPATIENT)
Dept: INTERNAL MEDICINE | Facility: OTHER | Age: 59
End: 2024-06-03
Attending: INTERNAL MEDICINE
Payer: COMMERCIAL

## 2024-06-03 VITALS
DIASTOLIC BLOOD PRESSURE: 68 MMHG | OXYGEN SATURATION: 98 % | RESPIRATION RATE: 20 BRPM | SYSTOLIC BLOOD PRESSURE: 112 MMHG | HEART RATE: 75 BPM | TEMPERATURE: 98.6 F | WEIGHT: 156 LBS | BODY MASS INDEX: 25.18 KG/M2

## 2024-06-03 DIAGNOSIS — K52.9 COLITIS: Primary | ICD-10-CM

## 2024-06-03 DIAGNOSIS — K92.2 GASTROINTESTINAL HEMORRHAGE, UNSPECIFIED GASTROINTESTINAL HEMORRHAGE TYPE: ICD-10-CM

## 2024-06-03 LAB
BASOPHILS # BLD AUTO: 0 10E3/UL (ref 0–0.2)
BASOPHILS NFR BLD AUTO: 0 %
CRP SERPL-MCNC: <3 MG/L
EOSINOPHIL # BLD AUTO: 0 10E3/UL (ref 0–0.7)
EOSINOPHIL NFR BLD AUTO: 0 %
ERYTHROCYTE [DISTWIDTH] IN BLOOD BY AUTOMATED COUNT: 15 % (ref 10–15)
HCT VFR BLD AUTO: 29.6 % (ref 35–47)
HGB BLD-MCNC: 9.7 G/DL (ref 11.7–15.7)
HOLD SPECIMEN: NORMAL
HOLD SPECIMEN: NORMAL
IMM GRANULOCYTES # BLD: 0.1 10E3/UL
IMM GRANULOCYTES NFR BLD: 1 %
LYMPHOCYTES # BLD AUTO: 1 10E3/UL (ref 0.8–5.3)
LYMPHOCYTES NFR BLD AUTO: 9 %
MCH RBC QN AUTO: 28.9 PG (ref 26.5–33)
MCHC RBC AUTO-ENTMCNC: 32.8 G/DL (ref 31.5–36.5)
MCV RBC AUTO: 88 FL (ref 78–100)
MONOCYTES # BLD AUTO: 0.2 10E3/UL (ref 0–1.3)
MONOCYTES NFR BLD AUTO: 2 %
NEUTROPHILS # BLD AUTO: 10.5 10E3/UL (ref 1.6–8.3)
NEUTROPHILS NFR BLD AUTO: 88 %
PLATELET # BLD AUTO: 571 10E3/UL (ref 150–450)
RBC # BLD AUTO: 3.36 10E6/UL (ref 3.8–5.2)
WBC # BLD AUTO: 11.9 10E3/UL (ref 4–11)

## 2024-06-03 PROCEDURE — 85025 COMPLETE CBC W/AUTO DIFF WBC: CPT | Performed by: INTERNAL MEDICINE

## 2024-06-03 PROCEDURE — 86481 TB AG RESPONSE T-CELL SUSP: CPT | Performed by: INTERNAL MEDICINE

## 2024-06-03 PROCEDURE — 86803 HEPATITIS C AB TEST: CPT | Performed by: INTERNAL MEDICINE

## 2024-06-03 PROCEDURE — 84433 ASY THIOPURIN S-MTHYLTRNSFRS: CPT | Mod: 90 | Performed by: INTERNAL MEDICINE

## 2024-06-03 PROCEDURE — 86704 HEP B CORE ANTIBODY TOTAL: CPT | Performed by: INTERNAL MEDICINE

## 2024-06-03 PROCEDURE — 36415 COLL VENOUS BLD VENIPUNCTURE: CPT | Performed by: INTERNAL MEDICINE

## 2024-06-03 PROCEDURE — 86140 C-REACTIVE PROTEIN: CPT | Performed by: INTERNAL MEDICINE

## 2024-06-03 PROCEDURE — 87340 HEPATITIS B SURFACE AG IA: CPT | Performed by: INTERNAL MEDICINE

## 2024-06-03 PROCEDURE — 86706 HEP B SURFACE ANTIBODY: CPT | Performed by: INTERNAL MEDICINE

## 2024-06-03 PROCEDURE — 99215 OFFICE O/P EST HI 40 MIN: CPT | Performed by: INTERNAL MEDICINE

## 2024-06-03 ASSESSMENT — PAIN SCALES - GENERAL: PAINLEVEL: NO PAIN (0)

## 2024-06-03 NOTE — PROGRESS NOTES
hospital follow up / [facility]- lower GI Bleed- Colonoscopy 5/30/24      Assessment & Plan   Problem List Items Addressed This Visit    None  Visit Diagnoses       Colitis    -  Primary    Relevant Orders    CBC with platelets and differential    Quantiferon-TB Gold Plus    Thiopurine Methyltransferase RBC    HC HEPATITIS A DELROY, IGG    Hepatitis C Screen Reflex to HCV RNA Quant and Genotype    Hepatitis B surface antigen    Hepatitis B Surface Antibody    Hepatitis B core antibody    CRP, inflammation    Gastrointestinal hemorrhage, unspecified gastrointestinal hemorrhage type               40 minutes of time was spent on this patient's care today.  Records from [facility] were reviewed.  Labs were also reviewed.  Labs were ordered today in addition to various questions being answered.  Patient was educated on the pathology and findings of CT scan.  All questions were answered.     MED REC REQUIRED  Post Medication Reconciliation Status: discharge medications reconciled and changed, per note/orders        Jordan Batres is a 58 year old, presenting for the following health issues:  Hospital F/U    NOHEMI Batres is a 58-year-old female who presents to clinic today after 2 hospitalizations in the recent days.  He was hospitalized at [facility] in [place] from May 22 to May 28, 2024 with right lower quadrant pain and bright red blood per rectum.  At 1 point her hemoglobin did drop to 8.  She did receive a CT scan which showed findings concerning for inflammatory bowel disease.  General surgery was consulted and colonoscopy on 5/27/2024 indicated ulcerative plaques and likely colitis.  Steroid taper was recommended and patient was discharged with outpatient GI referral.  Subsequently she did have recurrent symptoms on the day of discharge on May 28, 2024 and was again hospitalized until May 30, 2024 with recurrent GI bleed.  With that hospitalization she was transferred to [place] where she was seen by  gastroenterology.  She did undergo colonoscopy on 5/30/2024 and found to have healing colitis at the cecum and proximal ascending colon.  Underlying inflammatory bowel disease was still on the differential.  C. difficile testing was negative as was enteric testing of stool.  Repeat CT angiogram did not reveal any findings consistent with ischemic colitis.  She presents today to clinic still feeling weak and with some right lower quadrant tenderness.  She denies any melena or bright red blood per rectum.  She states that she had a bowel movement this morning which was said to be normal.  She does report some intermittent nausea but no vomiting.  Her appetite is quite diminished.  She remains on a low fiber diet.  Per request from GI she will have QuantiFERON, hepatitis B, hepatitis A, hepatitis C testing all completed today.  In addition a CBC will also be obtained.  Patient does have follow-up with GI scheduled for June 11, 2024.  At that time pathology will again be reviewed.  Per my understanding there is no immediate indication for repeat colonoscopy however further interventions in the way of treatment may be discussed.  Of note the patient is not a smoker.  She does report some various family members including cousins with possible inflammatory bowel disease however does not report any direct relatives with this.  She denies any involvement of her joints and her eyes at the current time.    Hospital Follow-up Visit:    Hospital/Nursing Home/IP Rehab Facility: [facility] Med Surg   Date of Admission: 5/29/24  Date of Discharge: 5/30/24  Reason(s) for Admission: lower GI bleeding, colitis   Was the patient in the ICU or did the patient experience delirium during hospitalization?  No  Do you have any other stressors you would like to discuss with your provider? No    Problems taking medications regularly:  None  Medication changes since discharge: prednisone 10 mg taper   Problems adhering to  non-medication therapy:  None    Summary of hospitalization:  CareEverywhere information obtained and reviewed  Diagnostic Tests/Treatments reviewed.  Follow up needed: GI  Other Healthcare Providers Involved in Patient s Care:         None  Update since discharge: improved.         Plan of care communicated with patient                     Review of Systems  Constitutional, HEENT, cardiovascular, pulmonary, gi and gu systems are negative, except as otherwise noted.      Objective    /68 (BP Location: Left arm, Patient Position: Sitting, Cuff Size: Adult Regular)   Pulse 75   Temp 98.6  F (37  C) (Tympanic)   Resp 20   Wt 70.8 kg (156 lb)   SpO2 98%   BMI 25.18 kg/m    Body mass index is 25.18 kg/m .  Physical Exam   GENERAL: alert and no distress  EYES: Eyes grossly normal to inspection, PERRL and conjunctivae and sclerae normal  RESP: lungs clear to auscultation - no rales, rhonchi or wheezes  CV: regular rate and rhythm, normal S1 S2, no S3 or S4, no murmur, click or rub, no peripheral edema  ABDOMEN: Patient's abdomen was not directly palpated but report of right lower quadrant tenderness.  MS: no gross musculoskeletal defects noted, no edema  SKIN: Skin was pale.  PSYCH: mentation appears normal, affect normal/bright    Office Visit on 05/21/2024   Component Date Value Ref Range Status    Color Urine 05/21/2024 Yellow  Colorless, Straw, Light Yellow, Yellow Final    Appearance Urine 05/21/2024 Cloudy (A)  Clear Final    Glucose Urine 05/21/2024 Negative  Negative mg/dL Final    Bilirubin Urine 05/21/2024 Negative  Negative Final    Ketones Urine 05/21/2024 Negative  Negative mg/dL Final    Specific Gravity Urine 05/21/2024 1.020  1.003 - 1.035 Final    Blood Urine 05/21/2024 Negative  Negative Final    pH Urine 05/21/2024 7.0  5.0 - 7.0 Final    Protein Albumin Urine 05/21/2024 Negative  Negative mg/dL Final    Urobilinogen Urine 05/21/2024 0.2  0.2, 1.0 E.U./dL Final    Nitrite Urine 05/21/2024  Negative  Negative Final    Leukocyte Esterase Urine 05/21/2024 Negative  Negative Final    Bacteria Urine 05/21/2024 Few (A)  None Seen /HPF Final    RBC Urine 05/21/2024 0-2  0-2 /HPF /HPF Final    WBC Urine 05/21/2024 0-5  0-5 /HPF /HPF Final    Squamous Epithelials Urine 05/21/2024 Few (A)  None Seen /LPF Final    Amorphous Crystals Urine 05/21/2024 Moderate (A)  None Seen /HPF Final    Culture 05/21/2024 <10,000 CFU/mL Urogenital calixto   Final     Results for orders placed or performed in visit on 06/03/24   CBC with platelets and differential     Status: None (In process)    Narrative    The following orders were created for panel order CBC with platelets and differential.  Procedure                               Abnormality         Status                     ---------                               -----------         ------                     CBC with platelets and d...[[id number]]                      In process                   Please view results for these tests on the individual orders.   Quantiferon-TB Gold Plus     Status: None (In process)    Specimen: Peripheral Blood    Narrative    The following orders were created for panel order Quantiferon-TB Gold Plus.  Procedure                               Abnormality         Status                     ---------                               -----------         ------                     Quantiferon TB Gold Plus...[[id number]]                      In process                 Quantiferon TB Gold Plus...[[id number]]                      In process                 Quantiferon TB Gold Plus...[[id number]]                      In process                 Quantiferon TB Gold Plus...[[id number]]                      In process                   Please view results for these tests on the individual orders.     Results for orders placed or performed in visit on 06/03/24 (from the past 24 hour(s))   CBC with platelets and differential    Narrative    The following orders  were created for panel order CBC with platelets and differential.  Procedure                               Abnormality         Status                     ---------                               -----------         ------                     CBC with platelets and d...[[id number]]                      In process                   Please view results for these tests on the individual orders.   Quantiferon-TB Gold Plus    Specimen: Peripheral Blood    Narrative    The following orders were created for panel order Quantiferon-TB Gold Plus.  Procedure                               Abnormality         Status                     ---------                               -----------         ------                     Quantiferon TB Gold Plus...[[id number]]                      In process                 Quantiferon TB Gold Plus...[[id number]]                      In process                 Quantiferon TB Gold Plus...[[id number]]                      In process                 Quantiferon TB Gold Plus...[[id number]]                      In process                   Please view results for these tests on the individual orders.           Signed Electronically by: Brock Peck DO

## 2024-06-05 LAB
GAMMA INTERFERON BACKGROUND BLD IA-ACNC: 0.02 IU/ML
HBV CORE AB SERPL QL IA: NONREACTIVE
HBV SURFACE AB SERPL IA-ACNC: 35.3 M[IU]/ML
HBV SURFACE AB SERPL IA-ACNC: REACTIVE M[IU]/ML
HBV SURFACE AG SERPL QL IA: NONREACTIVE
HCV AB SERPL QL IA: NONREACTIVE
M TB IFN-G BLD-IMP: NEGATIVE
M TB IFN-G CD4+ BCKGRND COR BLD-ACNC: 9.98 IU/ML
MITOGEN IGNF BCKGRD COR BLD-ACNC: 0.01 IU/ML
MITOGEN IGNF BCKGRD COR BLD-ACNC: 0.02 IU/ML
QUANTIFERON MITOGEN: 10 IU/ML
QUANTIFERON NIL TUBE: 0.02 IU/ML
QUANTIFERON TB1 TUBE: 0.03 IU/ML
QUANTIFERON TB2 TUBE: 0.04

## 2024-06-08 LAB — TPMT BLD-CCNC: 20.9 U/ML

## 2024-07-10 DIAGNOSIS — R73.03 PRE-DIABETES: ICD-10-CM

## 2024-07-10 RX ORDER — SEMAGLUTIDE 1.34 MG/ML
1 INJECTION, SOLUTION SUBCUTANEOUS
Qty: 3 ML | Refills: 3 | Status: SHIPPED | OUTPATIENT
Start: 2024-07-10 | End: 2024-07-19

## 2024-07-10 NOTE — TELEPHONE ENCOUNTER
OZEMPIC 4 MG/3 ML (1 MG/DOSE)         Last Written Prescription Date:  4/16/24  Last Fill Quantity: 3 mL,   # refills: 3  Last Office Visit: 6/3/24  Future Office visit:       Routing refill request to provider for review/approval because:    GLP-1 Agonists Protocol Ghbkfi50/10/2024 09:24 AM   Protocol Details HgbA1C in past 3 or 6 months    Medication indicated for associated diagnosis       Hemoglobin A1C   Date Value Ref Range Status   10/16/2023 5.8 (H) <5.7 % Final     Comment:     Normal <5.7%   Prediabetes 5.7-6.4%    Diabetes 6.5% or higher     Note: Adopted from ADA consensus guidelines.

## 2024-07-14 ENCOUNTER — HEALTH MAINTENANCE LETTER (OUTPATIENT)
Age: 59
End: 2024-07-14

## 2024-07-19 ENCOUNTER — MYC MEDICAL ADVICE (OUTPATIENT)
Dept: INTERNAL MEDICINE | Facility: OTHER | Age: 59
End: 2024-07-19

## 2024-07-19 DIAGNOSIS — R73.03 PRE-DIABETES: ICD-10-CM

## 2024-07-22 RX ORDER — SEMAGLUTIDE 1.34 MG/ML
1 INJECTION, SOLUTION SUBCUTANEOUS
Qty: 3 ML | Refills: 3 | Status: SHIPPED | OUTPATIENT
Start: 2024-07-22

## 2024-07-23 ENCOUNTER — TELEPHONE (OUTPATIENT)
Dept: INTERNAL MEDICINE | Facility: OTHER | Age: 59
End: 2024-07-23

## 2024-07-23 ENCOUNTER — MYC MEDICAL ADVICE (OUTPATIENT)
Dept: INTERNAL MEDICINE | Facility: OTHER | Age: 59
End: 2024-07-23

## 2024-07-23 NOTE — TELEPHONE ENCOUNTER
Received PA request from [facility] for Semaglutide, 1 MG/DOSE, (OZEMPIC, 1 MG/DOSE,) 4 MG/3ML pen. Submitted on CMM, waiting for response.

## 2024-07-25 NOTE — TELEPHONE ENCOUNTER
Received PA DENIAL from [facility] for Semaglutide, 1 MG/DOSE, (OZEMPIC, 1 MG/DOSE,) 4 MG/3ML pen. Scanned into [employer], routed to provider.    Reasoning: A1C numbers do not meet requirements of Ozempic

## 2024-07-26 NOTE — TELEPHONE ENCOUNTER
Pt called and is still waiting on the other insurance.   Pt in agreement to wait on the other insurance and will reach out once she hears back from them.

## 2024-08-08 ENCOUNTER — TELEPHONE (OUTPATIENT)
Dept: INTERNAL MEDICINE | Facility: OTHER | Age: 59
End: 2024-08-08

## 2024-08-08 NOTE — TELEPHONE ENCOUNTER
The patient is inquiring on the appeal. States she has still not heard back and is inquiring if the appeal has been started or where it is at in the process.     It does appear the denial received was due to lab results, however, the ozempic is used to ensure the lab results remain within target range.     Please let me know if you have any updates on this appeal.

## 2024-08-08 NOTE — TELEPHONE ENCOUNTER
PT called w secondary ins info for Semaglutide, 1 MG/DOSE, (OZEMPIC, 1 MG/DOSE,) 4 MG/3ML pen. [facility] BIN [id number]  Submitted on CMM, waiting for response.

## 2024-08-20 ENCOUNTER — MYC MEDICAL ADVICE (OUTPATIENT)
Dept: INTERNAL MEDICINE | Facility: OTHER | Age: 59
End: 2024-08-20

## 2024-08-20 NOTE — TELEPHONE ENCOUNTER
Dr. Peck has completed a Letter of Medical Necessity to submit to patients primary and secondary insurance company. Please submit letter.

## 2024-08-23 NOTE — TELEPHONE ENCOUNTER
Received DENIAL PA from [facility] [employer] for Semaglutide, 1 MG/DOSE, (OZEMPIC, 1 MG/DOSE,) 4 MG/3ML pen. Appealed w letter provided. Scanned into EPIC, routed to provider.   Reasoning: Without type II diabetes DX there is no pathway for approval.

## 2024-09-12 DIAGNOSIS — E03.9 ACQUIRED HYPOTHYROIDISM: ICD-10-CM

## 2024-09-16 ENCOUNTER — MYC REFILL (OUTPATIENT)
Dept: INTERNAL MEDICINE | Facility: OTHER | Age: 59
End: 2024-09-16

## 2024-09-16 DIAGNOSIS — E03.9 ACQUIRED HYPOTHYROIDISM: ICD-10-CM

## 2024-09-16 RX ORDER — LEVOTHYROXINE SODIUM 100 UG/1
TABLET ORAL
Qty: 45 TABLET | Refills: 2 | Status: SHIPPED | OUTPATIENT
Start: 2024-09-16

## 2024-09-17 DIAGNOSIS — E03.9 ACQUIRED HYPOTHYROIDISM: ICD-10-CM

## 2024-09-17 RX ORDER — LEVOTHYROXINE SODIUM 112 UG/1
TABLET ORAL
Qty: 45 TABLET | Refills: 2 | Status: SHIPPED | OUTPATIENT
Start: 2024-09-17

## 2024-09-17 RX ORDER — LEVOTHYROXINE SODIUM 112 UG/1
TABLET ORAL
Qty: 45 TABLET | Refills: 1 | OUTPATIENT
Start: 2024-09-17

## 2024-09-17 NOTE — TELEPHONE ENCOUNTER
My chart message request for refill on levothyroxine 112 mcg.     Disp Refills Start End FATIMAH   levothyroxine (SYNTHROID/LEVOTHROID) 112 MCG tablet 45 tablet 1 12/26/2023 -- No   Sig: Alternate every other day with the 100 mcg dose.   Sent to pharmacy as: Levothyroxine Sodium 112 MCG Oral Tablet (SYNTHROID/LEVOTHROID)   Class: E-Prescribe   Order: [id number]   E-Prescribing Status: Receipt confirmed by pharmacy (12/26/2023  2:04 PM CST)       LOV 6/3/24

## 2024-10-25 ENCOUNTER — TRANSFERRED RECORDS (OUTPATIENT)
Dept: HEALTH INFORMATION MANAGEMENT | Facility: CLINIC | Age: 59
End: 2024-10-25

## 2024-10-25 LAB — RETINOPATHY: NEGATIVE

## 2024-11-15 DIAGNOSIS — K75.81 NASH (NONALCOHOLIC STEATOHEPATITIS): Primary | ICD-10-CM

## 2024-12-03 ENCOUNTER — APPOINTMENT (OUTPATIENT)
Dept: GENERAL RADIOLOGY | Facility: HOSPITAL | Age: 59
End: 2024-12-03
Payer: COMMERCIAL

## 2024-12-03 ENCOUNTER — HOSPITAL ENCOUNTER (EMERGENCY)
Facility: HOSPITAL | Age: 59
Discharge: HOME | End: 2024-12-03
Payer: COMMERCIAL

## 2024-12-03 VITALS
DIASTOLIC BLOOD PRESSURE: 83 MMHG | WEIGHT: 156 LBS | RESPIRATION RATE: 18 BRPM | SYSTOLIC BLOOD PRESSURE: 141 MMHG | BODY MASS INDEX: 25.18 KG/M2 | OXYGEN SATURATION: 99 % | HEART RATE: 66 BPM

## 2024-12-03 DIAGNOSIS — M25.562 ACUTE PAIN OF LEFT KNEE: ICD-10-CM

## 2024-12-03 PROCEDURE — G0463 HOSPITAL OUTPT CLINIC VISIT: HCPCS

## 2024-12-03 PROCEDURE — 99213 OFFICE O/P EST LOW 20 MIN: CPT

## 2024-12-03 PROCEDURE — 73562 X-RAY EXAM OF KNEE 3: CPT | Mod: LT

## 2024-12-03 RX ORDER — MESALAMINE 1.2 G/1
1200 TABLET, DELAYED RELEASE ORAL
COMMUNITY

## 2024-12-03 ASSESSMENT — ENCOUNTER SYMPTOMS
COLOR CHANGE: 0
NUMBNESS: 0
ACTIVITY CHANGE: 1
JOINT SWELLING: 0
ARTHRALGIAS: 1
FEVER: 0

## 2024-12-03 ASSESSMENT — ACTIVITIES OF DAILY LIVING (ADL): ADLS_ACUITY_SCORE: 41

## 2024-12-03 NOTE — ED PROVIDER NOTES
History     Chief Complaint   Patient presents with     Knee Pain     HPI  Gisel Jameson is a 59 year old female who presents to the urgent care with a 3 week history of worsening left medial knee pain after twisting knee while walking laps. She is able to bear weight and ambulate. Denies numbness/tingling. No previous injuries to left knee. Has taken tylenol with minimal improvement.     Allergies:  Allergies   Allergen Reactions     Asa [Aspirin] Hives     Ivp Dye [Contrast Dye] Hives     Pcn [Penicillins] Hives     Sulfa Antibiotics Hives       Problem List:    Patient Active Problem List    Diagnosis Date Noted     Nephrolithiasis 10/13/2023     Priority: Medium     Seborrheic dermatitis 01/08/2019     Priority: Medium     History of dysplastic nevus 10/04/2013     Priority: Medium     Formatting of this note might be different from the original.   Right lower back mild atypia 2013       Vitamin D deficiency 06/05/2013     Priority: Medium     Dysplastic nevi 03/21/2013     Priority: Medium     Hyperlipidemia 07/14/2010     Priority: Medium     Epiphora 02/05/2007     Priority: Medium     Formatting of this note might be different from the original.   IMO Update       Alopecia 12/01/2006     Priority: Medium     Formatting of this note might be different from the original.   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   IMO Update 10 2016          Past Medical History:    Past Medical History:   Diagnosis Date     Hyperlipidemia LDL goal <100      Hypothyroidism      Nephrolithiasis      Pre-diabetes        Past Surgical History:    No past surgical history on file.    Family History:    Family History   Problem Relation Age of Onset     Heart Disease Mother      Pancreatic Cancer Father        Social History:  Marital  Status:   [2]  Social History     Tobacco Use     Smoking status: Never     Smokeless tobacco: Never   Vaping Use     Vaping status: Never Used        Medications:    levothyroxine (SYNTHROID/LEVOTHROID) 100 MCG tablet  levothyroxine (SYNTHROID/LEVOTHROID) 112 MCG tablet  mesalamine (LIALDA) 1.2 g DR tablet  metFORMIN (GLUCOPHAGE XR) 500 MG 24 hr tablet  rosuvastatin (CRESTOR) 10 MG tablet  Semaglutide, 1 MG/DOSE, (OZEMPIC, 1 MG/DOSE,) 4 MG/3ML pen  senna (SENOKOT) 8.6 MG tablet          Review of Systems   Constitutional:  Positive for activity change. Negative for fever.   Musculoskeletal:  Positive for arthralgias. Negative for joint swelling.   Skin:  Negative for color change.   Neurological:  Negative for numbness.   All other systems reviewed and are negative.      Physical Exam   BP: 141/83  Pulse: 66  Resp: 18  Weight: 70.8 kg (156 lb)  SpO2: 99 %      Physical Exam  Vitals and nursing note reviewed.   Constitutional:       General: She is not in acute distress.     Appearance: Normal appearance. She is not ill-appearing or toxic-appearing.   Cardiovascular:      Pulses: Normal pulses.   Musculoskeletal:         General: Tenderness present. No swelling or deformity.      Left knee: Swelling present. Decreased range of motion. Tenderness present over the medial joint line. Normal pulse.        Legs:    Skin:     General: Skin is warm and dry.      Capillary Refill: Capillary refill takes less than 2 seconds.      Findings: No bruising or erythema.   Neurological:      General: No focal deficit present.      Mental Status: She is alert and oriented to person, place, and time.   Psychiatric:         Mood and Affect: Mood normal.         Behavior: Behavior normal.         Thought Content: Thought content normal.         Judgment: Judgment normal.       ED Course        Procedures       Results for orders placed or performed during the hospital encounter of 12/03/24 (from the past 24 hours)   XR Knee Left  3 Views    Narrative    PROCEDURE:  XR KNEE LEFT 3 VIEWS    HISTORY: medial knee pain    COMPARISON:  None.    TECHNIQUE:  3 views of the left knee were obtained.    FINDINGS:  No fracture or dislocation is identified. The joint spaces  are preserved.        Impression    IMPRESSION: Normal left knee      JORGE A PERALTA MD         SYSTEM ID:  [id number]       Medications - No data to display    Assessments & Plan (with Medical Decision Making)     I have reviewed the nursing notes.    I have reviewed the findings, diagnosis, plan and need for follow up with the patient.  Gisel Jameson is a 59 year old female who presents to the urgent care with a 3 week history of worsening left medial knee pain after twisting knee while walking laps. States she walked 48 miles last month. She is able to bear weight and ambulate. Denies numbness/tingling. No previous injuries to left knee. Has taken tylenol with minimal improvement.     MDM: vital signs normal, afebrile. Non toxic in appearance with no noted distress. Able to fully bear weight and ambulate with discomfort. CMS intact and cap refill within 2 seconds to left lower extremity. No redness or warmth. Medial tenderness noted. XR reviewed with no acute fracture or dislocation. Most likely ligament injury. Ortho referral and outpatient MRI ordered. She has a knee brace from home, reapplied. Supportive measures and return precautions discussed. She is in agreement with plan.     (M25.562) Acute pain of left knee  Plan: They will be calling to schedule MRI. The orthopedics department will also be calling to schedule.     You can take 650-1000mg of tylenol every 6 hours as needed, max of 3000mg in 24 hours.    Ice for 15-20 minutes every 2-3 hours. Please make sure to protect skin to prevent frost bite.     Return with any new or concerning symptoms. Understanding verbalized.     New Prescriptions    No medications on file       Final diagnoses:   Acute pain of left knee        12/3/2024   [facility]       Cat Calixto NP  12/03/24 [id number]

## 2024-12-03 NOTE — ED TRIAGE NOTES
Pt presents with LT knee pain after twisting it at walking track. Pt took tylenol today for pain. Pt rates current pain 8/10.

## 2024-12-03 NOTE — DISCHARGE INSTRUCTIONS
They will be calling to schedule MRI. The orthopedics department will also be calling to schedule.     You can take 650-1000mg of tylenol every 6 hours as needed, max of 3000mg in 24 hours.    Ice for 15-20 minutes every 2-3 hours. Please make sure to protect skin to prevent frost bite.     Return with any new or concerning symptoms.

## 2024-12-04 ENCOUNTER — HOSPITAL ENCOUNTER (OUTPATIENT)
Dept: MRI IMAGING | Facility: HOSPITAL | Age: 59
Discharge: HOME | End: 2024-12-04
Payer: COMMERCIAL

## 2024-12-04 DIAGNOSIS — M25.562 ACUTE PAIN OF LEFT KNEE: ICD-10-CM

## 2024-12-04 PROCEDURE — 73721 MRI JNT OF LWR EXTRE W/O DYE: CPT | Mod: LT

## 2024-12-18 ENCOUNTER — OFFICE VISIT (OUTPATIENT)
Dept: ORTHOPEDICS | Facility: OTHER | Age: 59
End: 2024-12-18
Attending: ORTHOPAEDIC SURGERY
Payer: COMMERCIAL

## 2024-12-18 VITALS — DIASTOLIC BLOOD PRESSURE: 80 MMHG | HEART RATE: 77 BPM | SYSTOLIC BLOOD PRESSURE: 118 MMHG | OXYGEN SATURATION: 98 %

## 2024-12-18 DIAGNOSIS — M25.562 ACUTE PAIN OF LEFT KNEE: ICD-10-CM

## 2024-12-18 DIAGNOSIS — M17.12 PRIMARY LOCALIZED OSTEOARTHROSIS OF LEFT LOWER LEG: Primary | ICD-10-CM

## 2024-12-18 RX ORDER — LIDOCAINE HYDROCHLORIDE 10 MG/ML
1 INJECTION, SOLUTION EPIDURAL; INFILTRATION; INTRACAUDAL; PERINEURAL ONCE
Status: COMPLETED | OUTPATIENT
Start: 2024-12-18 | End: 2024-12-18

## 2024-12-18 RX ORDER — BETAMETHASONE SODIUM PHOSPHATE AND BETAMETHASONE ACETATE 3; 3 MG/ML; MG/ML
12 INJECTION, SUSPENSION INTRA-ARTICULAR; INTRALESIONAL; INTRAMUSCULAR; SOFT TISSUE ONCE
Status: COMPLETED | OUTPATIENT
Start: 2024-12-18 | End: 2024-12-18

## 2024-12-18 RX ADMIN — BETAMETHASONE SODIUM PHOSPHATE AND BETAMETHASONE ACETATE 12 MG: 3; 3 INJECTION, SUSPENSION INTRA-ARTICULAR; INTRALESIONAL; INTRAMUSCULAR; SOFT TISSUE at 14:35

## 2024-12-18 RX ADMIN — LIDOCAINE HYDROCHLORIDE 1 ML: 10 INJECTION, SOLUTION EPIDURAL; INFILTRATION; INTRACAUDAL; PERINEURAL at 14:38

## 2024-12-18 ASSESSMENT — PAIN SCALES - GENERAL: PAINLEVEL_OUTOF10: MODERATE PAIN (4)

## 2024-12-19 ENCOUNTER — OFFICE VISIT (OUTPATIENT)
Dept: FAMILY MEDICINE | Facility: OTHER | Age: 59
End: 2024-12-19
Attending: STUDENT IN AN ORGANIZED HEALTH CARE EDUCATION/TRAINING PROGRAM
Payer: COMMERCIAL

## 2024-12-19 VITALS
DIASTOLIC BLOOD PRESSURE: 76 MMHG | SYSTOLIC BLOOD PRESSURE: 118 MMHG | RESPIRATION RATE: 16 BRPM | WEIGHT: 162 LBS | OXYGEN SATURATION: 98 % | HEART RATE: 69 BPM | BODY MASS INDEX: 26.03 KG/M2 | TEMPERATURE: 96.9 F | HEIGHT: 66 IN

## 2024-12-19 DIAGNOSIS — K50.10 CROHN'S DISEASE OF COLON WITHOUT COMPLICATION (H): Chronic | ICD-10-CM

## 2024-12-19 DIAGNOSIS — R73.03 PREDIABETES: ICD-10-CM

## 2024-12-19 DIAGNOSIS — Z23 NEED FOR PROPHYLACTIC VACCINATION AND INOCULATION AGAINST INFLUENZA: ICD-10-CM

## 2024-12-19 DIAGNOSIS — K76.0 FATTY LIVER: ICD-10-CM

## 2024-12-19 DIAGNOSIS — R74.01 TRANSAMINITIS: ICD-10-CM

## 2024-12-19 DIAGNOSIS — Z78.0 ASYMPTOMATIC MENOPAUSAL STATE: ICD-10-CM

## 2024-12-19 DIAGNOSIS — Z78.9 TAKES DIETARY SUPPLEMENTS: ICD-10-CM

## 2024-12-19 DIAGNOSIS — E78.2 MIXED HYPERLIPIDEMIA: Chronic | ICD-10-CM

## 2024-12-19 DIAGNOSIS — E03.9 HYPOTHYROIDISM, UNSPECIFIED TYPE: ICD-10-CM

## 2024-12-19 DIAGNOSIS — Z76.89 ENCOUNTER TO ESTABLISH CARE: Primary | ICD-10-CM

## 2024-12-19 PROBLEM — K92.2 ACUTE LOWER GI HEMORRHAGE: Status: ACTIVE | Noted: 2024-05-29

## 2024-12-19 PROBLEM — A49.9 ESBL (EXTENDED SPECTRUM BETA-LACTAMASE) PRODUCING BACTERIA INFECTION: Chronic | Status: ACTIVE | Noted: 2024-05-23

## 2024-12-19 PROBLEM — K92.2 ACUTE LOWER GI HEMORRHAGE: Status: RESOLVED | Noted: 2024-05-29 | Resolved: 2024-12-19

## 2024-12-19 PROBLEM — Z87.442 HISTORY OF NEPHROLITHIASIS: Status: ACTIVE | Noted: 2023-10-13

## 2024-12-19 PROBLEM — D62 ACUTE BLOOD LOSS ANEMIA: Status: ACTIVE | Noted: 2024-05-29

## 2024-12-19 PROBLEM — D62 ACUTE BLOOD LOSS ANEMIA: Status: RESOLVED | Noted: 2024-05-29 | Resolved: 2024-12-19

## 2024-12-19 PROBLEM — Z87.442 HISTORY OF NEPHROLITHIASIS: Chronic | Status: ACTIVE | Noted: 2023-10-13

## 2024-12-19 PROBLEM — Z16.12 ESBL (EXTENDED SPECTRUM BETA-LACTAMASE) PRODUCING BACTERIA INFECTION: Chronic | Status: ACTIVE | Noted: 2024-05-23

## 2024-12-19 LAB
ALBUMIN SERPL BCG-MCNC: 4.6 G/DL (ref 3.5–5.2)
ALP SERPL-CCNC: 79 U/L (ref 40–150)
ALT SERPL W P-5'-P-CCNC: 291 U/L (ref 0–50)
AST SERPL W P-5'-P-CCNC: 117 U/L (ref 0–45)
BILIRUB DIRECT SERPL-MCNC: <0.2 MG/DL (ref 0–0.3)
BILIRUB SERPL-MCNC: 0.3 MG/DL
CHOLEST SERPL-MCNC: 172 MG/DL
EST. AVERAGE GLUCOSE BLD GHB EST-MCNC: 126 MG/DL
FASTING STATUS PATIENT QL REPORTED: YES
HBA1C MFR BLD: 6 %
HDLC SERPL-MCNC: 56 MG/DL
LDLC SERPL CALC-MCNC: 100 MG/DL
NONHDLC SERPL-MCNC: 116 MG/DL
PROT SERPL-MCNC: 7.9 G/DL (ref 6.4–8.3)
T4 FREE SERPL-MCNC: 1.37 NG/DL (ref 0.9–1.7)
TRIGL SERPL-MCNC: 81 MG/DL
TSH SERPL DL<=0.005 MIU/L-ACNC: 0.14 UIU/ML (ref 0.3–4.2)

## 2024-12-19 RX ORDER — PREDNISONE 10 MG/1
10 TABLET ORAL DAILY
COMMUNITY
Start: 2024-05-30 | End: 2024-12-19

## 2024-12-19 RX ORDER — MESALAMINE 1.2 G/1
2.4 TABLET, DELAYED RELEASE ORAL DAILY
COMMUNITY
Start: 2024-08-13

## 2024-12-19 ASSESSMENT — PAIN SCALES - GENERAL: PAINLEVEL_OUTOF10: MILD PAIN (3)

## 2024-12-19 NOTE — PROGRESS NOTES
Assessment & Plan     Encounter to establish care  Medical, surgical, family, and social histories discussed updated. Reviewed active healthcare problems. Medications reviewed. Reviewed [facility] records.     Hypothyroidism, unspecified type  TSH still low.  Will change Synthroid to 100 mcg taken daily.  Update TSH level in 6 weeks.  - TSH with free T4 reflex; Future  - TSH with free T4 reflex  - T4 free    Mixed hyperlipidemia  LDL borderline at 100.  Will continue Crestor 10 mg for now.  Recheck annually.  - Lipid Profile; Future  - Lipid Profile    Crohn's disease of colon without complication (H)  New diagnosis.  Has been stable since starting mesalamine.  Follows with [facility].    Fatty liver  History of fatty liver noted on ultrasound.  Recent LFTs have been stable.  Actually has consult with Russell Arnold tomorrow virtually.    Takes dietary supplements  Using a new natural GLP-1 with many different ingredients.  Will update liver enzymes.  - Hepatic panel (Albumin, ALT, AST, Bili, Alk Phos, TP); Future  - Hepatic panel (Albumin, ALT, AST, Bili, Alk Phos, TP)    Prediabetes  Slight worsening.  May want to increase metformin further if tolerating.  - Hemoglobin A1c; Future  - Hemoglobin A1c    Need for prophylactic vaccination and inoculation against influenza  Given today    Asymptomatic menopausal state  Recommend DEXA  - DX Bone Density; Future    Transaminitis  New on labs today.  Supplement is new, recommend she hold this.  Hold statin for now although unlikely related as she has been on this for years.  May also be related to mesalamine and Crohn's.  Will have her message her GI provider.  Plan recheck next week.  If not improving, will need further evaluation.  Concerning signs and symptoms reviewed that would indicate need for urgent re-evaluation. Patient stated understanding and agreement with the plan.          BMI  Estimated body mass index is 26.15 kg/m  as calculated from the following:     "Height as of this encounter: 1.676 m (5' 6\").    Weight as of this encounter: 73.5 kg (162 lb).   Weight management plan: Discussed healthy diet and exercise guidelines          45 minutes spent by me on the date of the encounter doing chart review, history and exam, documentation and further activities per the note    Jordan Batres is a 59 year old, presenting for the following health issues:  Establish Care and Imm/Inj (Flu Shot)        12/19/2024     9:49 AM   Additional Questions   Roomed by Martha briggs   Accompanied by self         12/19/2024   Declines Weight   Did patient decline having their weight taken? Yes         12/19/2024     9:49 AM   Patient Reported Additional Medications   Patient reports taking the following new medications None     History of Present Illness       Reason for visit:  Need to change providers due to mine us leaving    She eats 2-3 servings of fruits and vegetables daily.She consumes 0 sweetened beverage(s) daily.She exercises with enough effort to increase her heart rate 30 to 60 minutes per day.  She exercises with enough effort to increase her heart rate 4 days per week.   She is taking medications regularly.       Hyperlipidemia Follow-Up    Are you regularly taking any medication or supplement to lower your cholesterol?   Yes- rosuvastatin  Are you having muscle aches or other side effects that you think could be caused by your cholesterol lowering medication?  No    Hypothyroidism Follow-up    Since last visit, patient describes the following symptoms: dry skin, fatigue, and hair loss. Trouble losing weight    does every other day 112 vs 100mcg. Due for recheck.     Crohn's disease diagnosed earlier this year. Takes 2.4g daily. Repeat colonoscopy one  year. Sees [facility]     Not getting semaglutide anymore. Doing metformin. Tolerating w/o side effects.     Using new supplement. Sleeps better. Feels better. Marketed as natural GLP1    Sees Dr. Tietze OB - had pelvic and " "mammogram recently.     No history of dexa scan. History of big milk drinker. Cut this out. Not supplementing.         Objective    /76 (BP Location: Right arm, Patient Position: Sitting, Cuff Size: Adult Regular)   Pulse 69   Temp 96.9  F (36.1  C)   Resp 16   Ht 1.676 m (5' 6\")   Wt 73.5 kg (162 lb)   SpO2 98%   BMI 26.15 kg/m    Body mass index is 26.15 kg/m .    Physical Exam  Constitutional:       General: She is not in acute distress.     Appearance: Normal appearance.   Neck:      Thyroid: No thyroid mass, thyromegaly or thyroid tenderness.   Cardiovascular:      Rate and Rhythm: Normal rate and regular rhythm.      Heart sounds: No murmur heard.  Pulmonary:      Effort: Pulmonary effort is normal.      Breath sounds: Normal breath sounds. No wheezing, rhonchi or rales.   Abdominal:      Palpations: Abdomen is soft. There is no mass.      Tenderness: There is no abdominal tenderness. There is no guarding or rebound.   Musculoskeletal:      Cervical back: Neck supple.      Right lower leg: No edema.      Left lower leg: No edema.   Lymphadenopathy:      Cervical: No cervical adenopathy.   Neurological:      General: No focal deficit present.      Mental Status: She is alert and oriented to person, place, and time.   Psychiatric:         Mood and Affect: Mood normal.         Behavior: Behavior normal.          Results for orders placed or performed in visit on 12/19/24   TSH with free T4 reflex     Status: Abnormal   Result Value Ref Range    TSH 0.14 (L) 0.30 - 4.20 uIU/mL   Lipid Profile     Status: Abnormal   Result Value Ref Range    Cholesterol 172 <200 mg/dL    Triglycerides 81 <150 mg/dL    Direct Measure HDL 56 >=50 mg/dL    LDL Cholesterol Calculated 100 (H) <100 mg/dL    Non HDL Cholesterol 116 <130 mg/dL    Patient Fasting > 8hrs? Yes     Narrative    Cholesterol  Desirable: < 200 mg/dL  Borderline High: 200 - 239 mg/dL  High: >= 240 mg/dL    Triglycerides  Normal: < 150 " mg/dL  Borderline High: 150 - 199 mg/dL  High: 200-499 mg/dL  Very High: >= 500 mg/dL    Direct Measure HDL  Female: >= 50 mg/dL   Male: >= 40 mg/dL    LDL Cholesterol  Desirable: < 100 mg/dL  Above Desirable: 100 - 129 mg/dL   Borderline High: 130 - 159 mg/dL   High:  160 - 189 mg/dL   Very High: >= 190 mg/dL    Non HDL Cholesterol  Desirable: < 130 mg/dL  Above Desirable: 130 - 159 mg/dL  Borderline High: 160 - 189 mg/dL  High: 190 - 219 mg/dL  Very High: >= 220 mg/dL   Hemoglobin A1c     Status: Abnormal   Result Value Ref Range    Estimated Average Glucose 126 (H) <117 mg/dL    Hemoglobin A1C 6.0 (H) <5.7 %   Hepatic panel (Albumin, ALT, AST, Bili, Alk Phos, TP)     Status: Abnormal   Result Value Ref Range    Protein Total 7.9 6.4 - 8.3 g/dL    Albumin 4.6 3.5 - 5.2 g/dL    Bilirubin Total 0.3 <=1.2 mg/dL    Alkaline Phosphatase 79 40 - 150 U/L     (H) 0 - 45 U/L     (H) 0 - 50 U/L    Bilirubin Direct <0.20 0.00 - 0.30 mg/dL   T4 free     Status: Normal   Result Value Ref Range    Free T4 1.37 0.90 - 1.70 ng/dL               Signed Electronically by: Edwina Childress MD

## 2024-12-19 NOTE — PATIENT INSTRUCTIONS
Thank you for choosing [facility].   I have office hours 8:00 am to 4:30 pm on Mondays, Tuesdays, Thursdays and Fridays. I am out of the office most Wednesdays, although I do occasionally work one Wednesday per month.   Following your visit, if you had labs and diagnostic testing, once they have returned, I will review them and you will be contacted by myself or my nurse if there are concerns. You can also view the labs on [employer].   For refills, notify your pharmacy regarding what you need and the pharmacy will generate a refill request. Please plan ahead and allow 3-5 days for refill requests.  If you have an urgent/same day appointment need, please request an urgent visit when you call and our support staff will send me an Overbook request to try to accommodate you for the urgent visit. I like to fit in and see my own patients and hopefully save you time too!   You can also now schedule appointments on the [employer] effie.   In the event that you need to be seen for urgent concerns and I am out of office, please see one of my colleagues for acute concerns or you may also present to Urgent Care or the ER.  I appreciate the opportunity to serve you and look forward to supporting your healthcare needs in the future.      The imaging (radiology) department will call to schedule your dexa scan. If you do not hear to schedule, please call [phone] to schedule your imaging.      To help keep bones strong, in addition to routine cardio exercise and weight based exercise, you should get 1000 units Vitamin D daily and 1200 mg of Calcium from diet AND supplement COMBINED. Calcium should be taken in two divided doses, max dose at one time is 600mg. These can be found in women's multivitamins or as an additional supplement. Please check the label to ensure that your vitamin has enough of each and also check what you are eating to determine calcium supplement needed.    Foods and drinks with calcium  Food Calcium in  milligrams   Milk (skim, 2%, or whole; 8 oz [240 mL]) 300   Yogurt (6 oz [168 g]) 250   Orange juice (with calcium; 8 oz [240 mL]) 300   Tofu with calcium (0.5 cup [113 g]) 435   Cheese (1 oz [28 g]) 195 to 335 (hard cheese = higher calcium)   Cottage cheese (0.5 cup [113 g]) 130   Ice cream or frozen yogurt (0.5 cup [113 g]) 100   Fortified non-dairy milks (soy, oat, almond; 8 oz [240 mL]) 300 to 450   Beans (0.5 cup cooked [113 g]) 60 to 80   Dark, leafy green vegetables (0.5 cup cooked [113 g]) 50 to 135   Almonds (24 whole) 70   Orange (1 medium) 60

## 2024-12-23 ENCOUNTER — TRANSFERRED RECORDS (OUTPATIENT)
Dept: HEALTH INFORMATION MANAGEMENT | Facility: CLINIC | Age: 59
End: 2024-12-23

## 2024-12-23 DIAGNOSIS — E78.5 HYPERLIPIDEMIA LDL GOAL <100: ICD-10-CM

## 2024-12-24 RX ORDER — ROSUVASTATIN CALCIUM 10 MG/1
10 TABLET, COATED ORAL DAILY
Qty: 90 TABLET | Refills: 3 | Status: SHIPPED | OUTPATIENT
Start: 2024-12-24

## 2024-12-24 NOTE — TELEPHONE ENCOUNTER
rosuvastatin (CRESTOR) 10 MG tablet      Last Written Prescription Date:  12-  Last Fill Quantity: 90,   # refills: 3  Last Office Visit: 12-19-24  Future Office visit:

## 2024-12-26 NOTE — PROGRESS NOTES
ORTHOPEDIC CLINIC CONSULT    Referred by:     Chief Complaint: Gisel Jameson is a 59 year old female who is being seen for   Chief Complaint   Patient presents with    Musculoskeletal Problem     Left knee pain, MRI 12/4       History of Present Illness:   Patient is 59-year-old left male presents for left knee pain.  It has been increasing recently causing more medial joint line discomfort.  He is definitely more activity driven but also has a sometimes at rest.  Twisting and turning causes some discomfort.  She is well-known has a 1 had seen patients in [facility].  Now presents for ongoing left knee pain more acute recently but this had some on and off but acutely restrictive while on swelling and joint line pain    Patient's past medical, surgical, social and family histories reviewed.     Past Medical History:   Diagnosis Date    Acute blood loss anemia 05/29/2024    Acute lower GI hemorrhage 05/29/2024    Alopecia 12/01/2006    Formatting of this note might be different from the original.   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   IMO Update 10 2016         Past Surgical History:   Procedure Laterality Date    ABDOMEN SURGERY      Csection x 2, Lap Hystercetomy    BIOPSY  3/12/2013    Dysplastic Nevi; Benign neoplasm .6-1cm    COLONOSCOPY  06/2024    COLONOSCOPY  2017    clear    EYE SURGERY      Lasix, and eyelid surgery (blepharoplasty    GYN SURGERY      Hysterectomy left oophorectomy    HC URETERAL STENT REPOSITIONING Right 2023       Home Medications:  Prior to Admission medications    Medication Sig Start Date End Date Taking? Authorizing Provider   levothyroxine (SYNTHROID/LEVOTHROID) 100 MCG tablet TAKE 1 TABLET ALTERNATING EVERY OTHER DAY WITH  MCG DOSE 9/16/24  Yes [employer]  Brock COOPER DO   metFORMIN (GLUCOPHAGE XR) 500 MG 24 hr tablet Take 1 tablet (500 mg) by mouth 2 times daily (with meals) 12/21/23  Yes Brock Peck DO   senna (SENOKOT) 8.6 MG tablet Take 1 tablet by mouth 2 times daily 12/26/23  Yes Brock Peck DO   mesalamine (LIALDA) 1.2 g DR tablet Take 2.4 g by mouth daily. 8/13/24   Reported, Patient   NONFORMULARY [employer] weight supplement    Reported, Patient   rosuvastatin (CRESTOR) 10 MG tablet TAKE 1 TABLET DAILY 12/24/24   Brock Peck DO       Allergies   Allergen Reactions    Asa [Aspirin] Hives    Ivp Dye [Contrast Dye] Hives    Pcn [Penicillins] Hives    Sulfa Antibiotics Hives       Social History     Occupational History    Not on file   Tobacco Use    Smoking status: Never     Passive exposure: Never    Smokeless tobacco: Never   Vaping Use    Vaping status: Never Used   Substance and Sexual Activity    Alcohol use: Yes     Comment: rare    Drug use: Never    Sexual activity: Yes     Partners: Male     Birth control/protection: Female Surgical       Family History   Problem Relation Age of Onset    Heart Disease Mother     Diabetes Mother     Coronary Artery Disease Mother     Hypertension Mother     Pancreatic Cancer Father     Hyperlipidemia Father     Other Cancer Father         Pancreatic Cancer       REVIEW OF SYSTEMS            Physical Exam:    Vitals: /80   Pulse 77   SpO2 98%   BMI= There is no height or weight on file to calculate BMI.  On examination awake alert and oriented cooperative no apparent distress.  Has mild effusion is tender along the medial joint line but stable varus valgus stress anterior drawer and Lachman negative Alhaji's but does have some pain during stress but no pop no Alhaji's.  Radiographs: She had previously underwent MRI examination which notes some lower advancing joint space arthritis chondromalacia as well as medial subchondral bone edema.     Independent visualization of  the films was made.         Impression:      ICD-10-CM    1. Primary localized osteoarthrosis of left lower leg  M17.12 Large Joint/Bursa injection and/or drainage (Shoulder, Knee)     betamethasone acet & sod phos (CELESTONE) injection 12 mg     lidocaine (PF) (XYLOCAINE) 1 % injection 1 mL      2. Acute pain of left knee  M25.562 Orthopedic  Referral          Plan: Pression plan left knee medial joint space bone edema as well as a chondromalacia medial joint space particular better preserved patellofemoral lateral with intact meniscus.  Discussed in detail options both nonoperative and operative.  At this point to with his being a bit more acute and having not had any other particular treatment when he had discussed in decided to proceed with the more of a steroid shot to start with if need be we could add an  brace and physical therapy and anti-inflammatories.  Will start with a injection into the left knee.  If she fails to get adequate improvement then we can with other modalities and options.    Procedure: Left knee injection left knee was prepped with alcohol and ChloraPrep for sterile technique injection solution of 1 cc of lidocaine and 2 cc of 6 mg/mL Celestone was drawn up under sterile technique the knee was numbed with chloride and with sterile technique injection of the left knee.  Tolerated well    All of the above pertinent physical exam and imaging modalities findings was reviewed with Gisel.    Return to clinic in when symptoms recur weeks.    Further imaging required none at this time    Time spent with evaluation: 30 minutes    Paras Sanz MD  12/25/2024  6:39 PM

## 2024-12-31 ENCOUNTER — HOSPITAL ENCOUNTER (OUTPATIENT)
Dept: BONE DENSITY | Facility: HOSPITAL | Age: 59
Discharge: HOME | End: 2024-12-31
Attending: STUDENT IN AN ORGANIZED HEALTH CARE EDUCATION/TRAINING PROGRAM
Payer: COMMERCIAL

## 2024-12-31 DIAGNOSIS — Z78.0 ASYMPTOMATIC MENOPAUSAL STATE: ICD-10-CM

## 2024-12-31 PROCEDURE — 77080 DXA BONE DENSITY AXIAL: CPT

## 2025-01-19 DIAGNOSIS — R73.03 PRE-DIABETES: ICD-10-CM

## 2025-01-20 ENCOUNTER — LAB (OUTPATIENT)
Dept: LAB | Facility: OTHER | Age: 60
End: 2025-01-20
Attending: STUDENT IN AN ORGANIZED HEALTH CARE EDUCATION/TRAINING PROGRAM
Payer: COMMERCIAL

## 2025-01-20 ENCOUNTER — TELEPHONE (OUTPATIENT)
Dept: FAMILY MEDICINE | Facility: OTHER | Age: 60
End: 2025-01-20

## 2025-01-20 ENCOUNTER — HOSPITAL ENCOUNTER (OUTPATIENT)
Dept: ULTRASOUND IMAGING | Facility: HOSPITAL | Age: 60
Discharge: HOME | End: 2025-01-20
Attending: STUDENT IN AN ORGANIZED HEALTH CARE EDUCATION/TRAINING PROGRAM
Payer: COMMERCIAL

## 2025-01-20 ENCOUNTER — OFFICE VISIT (OUTPATIENT)
Dept: FAMILY MEDICINE | Facility: OTHER | Age: 60
End: 2025-01-20
Attending: STUDENT IN AN ORGANIZED HEALTH CARE EDUCATION/TRAINING PROGRAM
Payer: COMMERCIAL

## 2025-01-20 ENCOUNTER — MYC MEDICAL ADVICE (OUTPATIENT)
Dept: FAMILY MEDICINE | Facility: OTHER | Age: 60
End: 2025-01-20

## 2025-01-20 VITALS
DIASTOLIC BLOOD PRESSURE: 87 MMHG | HEART RATE: 65 BPM | TEMPERATURE: 96.7 F | BODY MASS INDEX: 26.31 KG/M2 | SYSTOLIC BLOOD PRESSURE: 135 MMHG | WEIGHT: 163 LBS | RESPIRATION RATE: 16 BRPM | OXYGEN SATURATION: 99 %

## 2025-01-20 DIAGNOSIS — N20.0 NEPHROLITHIASIS: ICD-10-CM

## 2025-01-20 DIAGNOSIS — R74.01 TRANSAMINITIS: ICD-10-CM

## 2025-01-20 DIAGNOSIS — R10.11 RIGHT UPPER QUADRANT PAIN: ICD-10-CM

## 2025-01-20 DIAGNOSIS — K75.4 AUTOIMMUNE HEPATITIS (H): Primary | ICD-10-CM

## 2025-01-20 DIAGNOSIS — E03.9 HYPOTHYROIDISM, UNSPECIFIED TYPE: ICD-10-CM

## 2025-01-20 LAB
ALBUMIN SERPL BCG-MCNC: 4.6 G/DL (ref 3.5–5.2)
ALBUMIN UR-MCNC: NEGATIVE MG/DL
ALP SERPL-CCNC: 91 U/L (ref 40–150)
ALT SERPL W P-5'-P-CCNC: 569 U/L (ref 0–50)
AMMONIA PLAS-SCNC: 11 UMOL/L (ref 11–51)
ANION GAP SERPL CALCULATED.3IONS-SCNC: 7 MMOL/L (ref 7–15)
APPEARANCE UR: CLEAR
AST SERPL W P-5'-P-CCNC: 270 U/L (ref 0–45)
BASOPHILS # BLD AUTO: 0 10E3/UL (ref 0–0.2)
BASOPHILS NFR BLD AUTO: 0 %
BILIRUB DIRECT SERPL-MCNC: <0.2 MG/DL (ref 0–0.3)
BILIRUB SERPL-MCNC: 0.3 MG/DL
BILIRUB UR QL STRIP: NEGATIVE
BUN SERPL-MCNC: 17.6 MG/DL (ref 8–23)
CALCIUM SERPL-MCNC: 9.7 MG/DL (ref 8.8–10.4)
CHLORIDE SERPL-SCNC: 103 MMOL/L (ref 98–107)
COLOR UR AUTO: ABNORMAL
CREAT SERPL-MCNC: 0.88 MG/DL (ref 0.51–0.95)
CRP SERPL-MCNC: <3 MG/L
EGFRCR SERPLBLD CKD-EPI 2021: 75 ML/MIN/1.73M2
EOSINOPHIL # BLD AUTO: 0 10E3/UL (ref 0–0.7)
EOSINOPHIL NFR BLD AUTO: 1 %
ERYTHROCYTE [DISTWIDTH] IN BLOOD BY AUTOMATED COUNT: 13.9 % (ref 10–15)
GLUCOSE SERPL-MCNC: 113 MG/DL (ref 70–99)
GLUCOSE UR STRIP-MCNC: NEGATIVE MG/DL
HCO3 SERPL-SCNC: 28 MMOL/L (ref 22–29)
HCT VFR BLD AUTO: 43.1 % (ref 35–47)
HGB BLD-MCNC: 13.9 G/DL (ref 11.7–15.7)
HGB UR QL STRIP: NEGATIVE
IMM GRANULOCYTES # BLD: 0 10E3/UL
IMM GRANULOCYTES NFR BLD: 0 %
INR PPP: 1 (ref 0.85–1.15)
KETONES UR STRIP-MCNC: NEGATIVE MG/DL
LEUKOCYTE ESTERASE UR QL STRIP: NEGATIVE
LIPASE SERPL-CCNC: 48 U/L (ref 13–60)
LYMPHOCYTES # BLD AUTO: 1.4 10E3/UL (ref 0.8–5.3)
LYMPHOCYTES NFR BLD AUTO: 26 %
MCH RBC QN AUTO: 27.6 PG (ref 26.5–33)
MCHC RBC AUTO-ENTMCNC: 32.3 G/DL (ref 31.5–36.5)
MCV RBC AUTO: 86 FL (ref 78–100)
MONOCYTES # BLD AUTO: 0.7 10E3/UL (ref 0–1.3)
MONOCYTES NFR BLD AUTO: 12 %
MUCOUS THREADS #/AREA URNS LPF: PRESENT /LPF
NEUTROPHILS # BLD AUTO: 3.4 10E3/UL (ref 1.6–8.3)
NEUTROPHILS NFR BLD AUTO: 61 %
NITRATE UR QL: NEGATIVE
NRBC # BLD AUTO: 0 10E3/UL
NRBC BLD AUTO-RTO: 0 /100
PH UR STRIP: 7 [PH] (ref 4.7–8)
PLATELET # BLD AUTO: 303 10E3/UL (ref 150–450)
POTASSIUM SERPL-SCNC: 3.8 MMOL/L (ref 3.4–5.3)
PROT SERPL-MCNC: 8 G/DL (ref 6.4–8.3)
RBC # BLD AUTO: 5.03 10E6/UL (ref 3.8–5.2)
RBC URINE: 0 /HPF
SODIUM SERPL-SCNC: 138 MMOL/L (ref 135–145)
SP GR UR STRIP: 1.01 (ref 1–1.03)
SQUAMOUS EPITHELIAL: 0 /HPF
TSH SERPL DL<=0.005 MIU/L-ACNC: 1.16 UIU/ML (ref 0.3–4.2)
UROBILINOGEN UR STRIP-MCNC: NORMAL MG/DL
WBC # BLD AUTO: 5.5 10E3/UL (ref 4–11)
WBC URINE: <1 /HPF

## 2025-01-20 PROCEDURE — 83690 ASSAY OF LIPASE: CPT

## 2025-01-20 PROCEDURE — 80050 GENERAL HEALTH PANEL: CPT

## 2025-01-20 PROCEDURE — 82977 ASSAY OF GGT: CPT

## 2025-01-20 PROCEDURE — 76705 ECHO EXAM OF ABDOMEN: CPT

## 2025-01-20 PROCEDURE — 36415 COLL VENOUS BLD VENIPUNCTURE: CPT

## 2025-01-20 PROCEDURE — 86140 C-REACTIVE PROTEIN: CPT

## 2025-01-20 PROCEDURE — 82248 BILIRUBIN DIRECT: CPT

## 2025-01-20 RX ORDER — METFORMIN HYDROCHLORIDE 500 MG/1
500 TABLET, EXTENDED RELEASE ORAL 2 TIMES DAILY WITH MEALS
Qty: 180 TABLET | Refills: 3 | Status: SHIPPED | OUTPATIENT
Start: 2025-01-20

## 2025-01-20 RX ORDER — PREDNISONE 10 MG/1
TABLET ORAL
Qty: 70 TABLET | Refills: 0 | Status: SHIPPED | OUTPATIENT
Start: 2025-01-20 | End: 2025-02-17

## 2025-01-20 ASSESSMENT — PAIN SCALES - GENERAL: PAINLEVEL_OUTOF10: MODERATE PAIN (4)

## 2025-01-20 NOTE — PROGRESS NOTES
Assessment & Plan     Autoimmune hepatitis (H) / Right upper quadrant pain / Transaminitis  Worsening transaminitis.  Does have increased right upper quadrant pressure, not associated with food and there is no colicky pattern. No red flags.   Did obtain right upper quadrant ultrasound to evaluate, does have cholelithiasis but no gallbladder inflammation or bile duct dilation nor stones in the common bile duct.  All other labs including INR, CBC, ammonia reassuringly normal. Had full lab evaluation with Russell Arnold last month.   Discussed with [facility] in [place] (who has seen the patient) and he has strong belief this is autoimmune hepatitis.  Patient will need liver biopsy, which he is ordering.  Recommend I start patient on prednisone taper.  Will need monitoring labs weekly, he is ordering these through [facility] & patient was arranged for this.  I will update her liver enzymes on Friday.  Did also get IgG level at his request today.  Reassuring ammonia and INR level are normal.  If they were significantly abnormal and she had symptoms of altered sensorium or mental state, would need admission and further evaluation for this.  Reviewed in detail with the patient.   Will stop all supplements and continue to hold statin.   Concerning signs and symptoms reviewed that would indicate need for urgent re-evaluation. Patient stated understanding and agreement with the plan.   - predniSONE (DELTASONE) 10 MG tablet; Take 4 tablets (40 mg) by mouth daily for 7 days, THEN 3 tablets (30 mg) daily for 7 days, THEN 2 tablets (20 mg) daily for 7 days, THEN 1 tablet (10 mg) daily for 7 days.  - CBC with Platelets & Differential; Future  - Comprehensive metabolic panel; Future  - CRP inflammation; Future  - Lipase; Future  - GGT; Future  - US Abdomen Limited; Future  - Ammonia; Future  - IgG; Future  - INR; Future  - Ammonia  - IgG  - INR    Nephrolithiasis  History of.  Mild hydronephrosis noted on ultrasound but improved  from the prior when she did have a known stone.  UA negative for infection or blood.  Consider updating renal CT in the future to evaluate for ongoing stone disease.  Do not think present symptoms are related.  - UA with Microscopic reflex to Culture - [place]; Future  - UA with Microscopic reflex to Culture - [place]        The longitudinal plan of care for the diagnosis(es)/condition(s) as documented were addressed during this visit. Due to the added complexity in care, I will continue to support Gisel in the subsequent management and with ongoing continuity of care.      42 minutes spent by me on the date of the encounter doing chart review, history and exam, documentation and further activities per the note    Subjective   Gisel is a 59 year old, presenting for the following health issues:  Abdominal Pain        1/20/2025    12:58 PM   Additional Questions   Roomed by Saad Farfan   Accompanied by None         1/20/2025    12:58 PM   Patient Reported Additional Medications   Patient reports taking the following new medications None     History of Present Illness       Reason for visit:  Upper right quadrant pain/fullness  Symptom onset:  1-2 weeks ago  Symptoms include:  Dull pain under the right rib cage, it feels like I have something in there  Symptom intensity:  Moderate  Symptom progression:  Staying the same  Had these symptoms before:  No  What makes it worse:  No  What makes it better:  No   She is taking medications regularly.       ABDOMINAL PAIN (Upper Right Quadrant pain)  Onset: 1 month(s) ago  Description:   Location: right upper quadrant  Radiation: None  Character: Sharp, Dull ache, and Fullness  Frequency (if intermittent):Constant         Pain-free between episodes: no  History:    Previous similar pain? No   Previous tests done? x-ray, CT, and Colonoscopy  Any related trauma?: no  Accompanying Signs & Symptoms:   Fever? No  Nausea No  Vomiting (bloody?, coffee-grounds?) no  Dysuria?  No  Hematuria: No  Change in stool color: no  Change in stool pattern: No  Weight loss: No  Precipitating and/or Alleviating factors:   Does the pain change with: Food No                                                BM No                                                Body movement No                                                 Urination No  Possibility of Pregnancy: N/A ; No LMP recorded. Patient has had a hysterectomy.  Therapies tried and outcome: bowel movement, movement, sitting up, standing, and urination with  no relief      Fullness in RUQ, abdomen feels distended.   Worsening over last month.   Having normal bowel movements, urinating. IBD is stable.   No nausea. No vomitting.   Pain is not associated with food.   No bloody stools.   Still has gallbladder. Possible history of gallstones.   Comes in waves maybe. Varies. Not necessarily associated with food.   More like a lot of pressure.   No fevers.   Possible celiac disease. TTG positive. EBV positive in Dec.   Hasn't been sick.   Has been trying to eat GF diet  No confusion. Maybe not as clear headed.   No rashes.                  Objective    /87 (BP Location: Right arm, Patient Position: Sitting, Cuff Size: Adult Regular)   Pulse 65   Temp (!) 96.7  F (35.9  C) (Tympanic)   Resp 16   Wt 73.9 kg (163 lb)   SpO2 99%   BMI 26.31 kg/m    Body mass index is 26.31 kg/m .    Physical Exam  Constitutional:       General: She is not in acute distress.     Appearance: Normal appearance.   Eyes:      General: No scleral icterus.     Conjunctiva/sclera: Conjunctivae normal.      Pupils: Pupils are equal, round, and reactive to light.   Cardiovascular:      Rate and Rhythm: Normal rate and regular rhythm.      Heart sounds: No murmur heard.  Pulmonary:      Effort: Pulmonary effort is normal.      Breath sounds: Normal breath sounds. No wheezing, rhonchi or rales.   Abdominal:      General: Bowel sounds are normal. There is no distension.       Palpations: Abdomen is soft. There is no hepatomegaly, splenomegaly or mass.      Tenderness: There is abdominal tenderness in the right upper quadrant. There is no right CVA tenderness, left CVA tenderness, guarding or rebound. Negative signs include Smart's sign.   Musculoskeletal:      Right lower leg: No edema.      Left lower leg: No edema.   Neurological:      General: No focal deficit present.      Mental Status: She is alert and oriented to person, place, and time. Mental status is at baseline.   Psychiatric:         Mood and Affect: Mood normal.         Behavior: Behavior normal.         Thought Content: Thought content normal.         Judgment: Judgment normal.        Results for orders placed or performed during the hospital encounter of 01/20/25   US Abdomen Limited     Status: None    Narrative    PROCEDURES: US ABDOMEN LIMITED    HISTORY:59 years Female   right upper quadrant pain, under the rib  cage. Symptoms began one to 2 weeks ago..    TECHNIQUE:  Ultrasound of the right upper quadrant was performed.    COMPARISON: CT 6/8/2023 , ultrasound 5/31/2023    FINDINGS:    PANCREAS:The pancreatic head and body are unremarkable.    Hepatic echogenicity is uniform. Length is 15 cm.    Gallbladder: Gallstones are present. Wall thickness is 2 mm. The  common duct is 3 mm. There is no dilatation.    The right kidney is 10.7 cm in length. There is mild hydronephrosis. A  proximal right ureteral calculus was present previously. Severity of  hydronephrosis is less than that of the CT and ultrasound of 2023. .      Impression    IMPRESSION: Cholelithiasis. There is no abnormal gallbladder wall  thickening. There is no biliary dilatation.    There is mild right-sided hydronephrosis. Severity of hydronephrosis  is less than that seen previously.    GLENN LADD MD         SYSTEM ID:  [id number]   Results for orders placed or performed in visit on 01/20/25   UA with Microscopic reflex to Culture - [place]      Status: Abnormal    Specimen: Urine, Clean Catch   Result Value Ref Range    Color Urine Straw Colorless, Straw, Light Yellow, Yellow    Appearance Urine Clear Clear    Glucose Urine Negative Negative mg/dL    Bilirubin Urine Negative Negative    Ketones Urine Negative Negative mg/dL    Specific Gravity Urine 1.007 1.003 - 1.035    Blood Urine Negative Negative    pH Urine 7.0 4.7 - 8.0    Protein Albumin Urine Negative Negative mg/dL    Urobilinogen Urine Normal Normal, 2.0 mg/dL    Nitrite Urine Negative Negative    Leukocyte Esterase Urine Negative Negative    Mucus Urine Present (A) None Seen /LPF    RBC Urine 0 <=2 /HPF    WBC Urine <1 <=5 /HPF    Squamous Epithelials Urine 0 <=1 /HPF    Narrative    Urine Culture not indicated   Ammonia     Status: Normal   Result Value Ref Range    Ammonia 11 11 - 51 umol/L   INR     Status: Normal   Result Value Ref Range    INR 1.00 0.85 - 1.15   Results for orders placed or performed in visit on 01/20/25   TSH with free T4 reflex     Status: Normal   Result Value Ref Range    TSH 1.16 0.30 - 4.20 uIU/mL   Comprehensive metabolic panel     Status: Abnormal   Result Value Ref Range    Sodium 138 135 - 145 mmol/L    Potassium 3.8 3.4 - 5.3 mmol/L    Carbon Dioxide (CO2) 28 22 - 29 mmol/L    Anion Gap 7 7 - 15 mmol/L    Urea Nitrogen 17.6 8.0 - 23.0 mg/dL    Creatinine 0.88 0.51 - 0.95 mg/dL    GFR Estimate 75 >60 mL/min/1.73m2    Calcium 9.7 8.8 - 10.4 mg/dL    Chloride 103 98 - 107 mmol/L    Glucose 113 (H) 70 - 99 mg/dL    Alkaline Phosphatase 91 40 - 150 U/L     (H) 0 - 45 U/L     (HH) 0 - 50 U/L    Protein Total 8.0 6.4 - 8.3 g/dL    Albumin 4.6 3.5 - 5.2 g/dL    Bilirubin Total 0.3 <=1.2 mg/dL   CRP inflammation     Status: Normal   Result Value Ref Range    CRP Inflammation <3.00 <5.00 mg/L   Lipase     Status: Normal   Result Value Ref Range    Lipase 48 13 - 60 U/L   CBC with platelets and differential     Status: None   Result Value Ref Range    WBC  Count 5.5 4.0 - 11.0 10e3/uL    RBC Count 5.03 3.80 - 5.20 10e6/uL    Hemoglobin 13.9 11.7 - 15.7 g/dL    Hematocrit 43.1 35.0 - 47.0 %    MCV 86 78 - 100 fL    MCH 27.6 26.5 - 33.0 pg    MCHC 32.3 31.5 - 36.5 g/dL    RDW 13.9 10.0 - 15.0 %    Platelet Count 303 150 - 450 10e3/uL    % Neutrophils 61 %    % Lymphocytes 26 %    % Monocytes 12 %    % Eosinophils 1 %    % Basophils 0 %    % Immature Granulocytes 0 %    NRBCs per 100 WBC 0 <1 /100    Absolute Neutrophils 3.4 1.6 - 8.3 10e3/uL    Absolute Lymphocytes 1.4 0.8 - 5.3 10e3/uL    Absolute Monocytes 0.7 0.0 - 1.3 10e3/uL    Absolute Eosinophils 0.0 0.0 - 0.7 10e3/uL    Absolute Basophils 0.0 0.0 - 0.2 10e3/uL    Absolute Immature Granulocytes 0.0 <=0.4 10e3/uL    Absolute NRBCs 0.0 10e3/uL   Bilirubin direct     Status: Normal   Result Value Ref Range    Bilirubin Direct <0.20 0.00 - 0.30 mg/dL   CBC with Platelets & Differential     Status: None    Narrative    The following orders were created for panel order CBC with Platelets & Differential.  Procedure                               Abnormality         Status                     ---------                               -----------         ------                     CBC with platelets and d...[[id number]]                      Final result                 Please view results for these tests on the individual orders.               Signed Electronically by: Edwina Childress MD

## 2025-01-20 NOTE — TELEPHONE ENCOUNTER
Metformin      Last Written Prescription Date:  12/21/23  Last Fill Quantity: 180,   # refills: 3  Last Office Visit: 1/20/25  Future Office visit:       Routing refill request to provider for review/approval because:

## 2025-01-20 NOTE — PATIENT INSTRUCTIONS
Hold statin, all herbal medications.   Labs Friday at [facility] put in orders  Will get called for liver biopsy   Do prednisone taper. Take with food.

## 2025-01-20 NOTE — TELEPHONE ENCOUNTER
DATE/TIME OF CALL RECEIVED FROM LAB:  01/20/25 at 1:39 PM   LAB TEST:  ALT  LAB VALUE:  569  PROVIDER NOTIFIED?: Yes  PROVIDER NAME: Dr. Edwina Childress  DATE/TIME LAB VALUE REPORTED TO PROVIDER: 1:39 PM    MECHANISM OF PROVIDER NOTIFICATION: Face-To-Face  PROVIDER RESPONSE:

## 2025-01-21 LAB — GGT SERPL-CCNC: 168 U/L (ref 5–36)

## 2025-01-22 LAB — IGG SERPL-MCNC: 1226 MG/DL (ref 610–1616)

## 2025-01-24 ENCOUNTER — LAB (OUTPATIENT)
Dept: LAB | Facility: OTHER | Age: 60
End: 2025-01-24
Payer: COMMERCIAL

## 2025-01-24 DIAGNOSIS — R74.01 TRANSAMINITIS: ICD-10-CM

## 2025-01-24 LAB
ALBUMIN SERPL BCG-MCNC: 4.6 G/DL (ref 3.5–5.2)
ALP SERPL-CCNC: 77 U/L (ref 40–150)
ALT SERPL W P-5'-P-CCNC: 365 U/L (ref 0–50)
AST SERPL W P-5'-P-CCNC: 112 U/L (ref 0–45)
BILIRUB DIRECT SERPL-MCNC: <0.2 MG/DL (ref 0–0.3)
BILIRUB SERPL-MCNC: 0.3 MG/DL
PROT SERPL-MCNC: 7.5 G/DL (ref 6.4–8.3)

## 2025-01-24 PROCEDURE — 36415 COLL VENOUS BLD VENIPUNCTURE: CPT

## 2025-01-24 PROCEDURE — 80076 HEPATIC FUNCTION PANEL: CPT

## 2025-02-03 ENCOUNTER — TRANSFERRED RECORDS (OUTPATIENT)
Dept: HEALTH INFORMATION MANAGEMENT | Facility: CLINIC | Age: 60
End: 2025-02-03

## 2025-02-06 ENCOUNTER — TRANSFERRED RECORDS (OUTPATIENT)
Dept: HEALTH INFORMATION MANAGEMENT | Facility: CLINIC | Age: 60
End: 2025-02-06

## 2025-02-10 ENCOUNTER — MYC MEDICAL ADVICE (OUTPATIENT)
Dept: FAMILY MEDICINE | Facility: OTHER | Age: 60
End: 2025-02-10

## 2025-02-10 ENCOUNTER — TRANSFERRED RECORDS (OUTPATIENT)
Dept: HEALTH INFORMATION MANAGEMENT | Facility: CLINIC | Age: 60
End: 2025-02-10

## 2025-02-10 DIAGNOSIS — E03.9 HYPOTHYROIDISM, UNSPECIFIED TYPE: Primary | ICD-10-CM

## 2025-02-10 RX ORDER — LEVOTHYROXINE SODIUM 100 UG/1
100 TABLET ORAL
Qty: 90 TABLET | Refills: 2 | Status: SHIPPED | OUTPATIENT
Start: 2025-02-10

## 2025-02-10 NOTE — TELEPHONE ENCOUNTER
Pended up a new prescription for the levothyroxine 100 mcg as  per pt she is only taking the 100 mcg daily and no longer alternating with the 112 mcg dose.   Please review and sign if appropriate.

## 2025-02-19 ENCOUNTER — TRANSFERRED RECORDS (OUTPATIENT)
Dept: HEALTH INFORMATION MANAGEMENT | Facility: CLINIC | Age: 60
End: 2025-02-19

## 2025-02-25 ENCOUNTER — OFFICE VISIT (OUTPATIENT)
Dept: FAMILY MEDICINE | Facility: OTHER | Age: 60
End: 2025-02-25
Attending: STUDENT IN AN ORGANIZED HEALTH CARE EDUCATION/TRAINING PROGRAM
Payer: COMMERCIAL

## 2025-02-25 VITALS
BODY MASS INDEX: 26.68 KG/M2 | HEIGHT: 66 IN | OXYGEN SATURATION: 99 % | HEART RATE: 64 BPM | TEMPERATURE: 96.9 F | SYSTOLIC BLOOD PRESSURE: 120 MMHG | DIASTOLIC BLOOD PRESSURE: 76 MMHG | RESPIRATION RATE: 16 BRPM | WEIGHT: 166 LBS

## 2025-02-25 DIAGNOSIS — E78.2 MIXED HYPERLIPIDEMIA: Chronic | ICD-10-CM

## 2025-02-25 DIAGNOSIS — R73.03 PREDIABETES: Chronic | ICD-10-CM

## 2025-02-25 DIAGNOSIS — E66.3 OVERWEIGHT (BMI 25.0-29.9): ICD-10-CM

## 2025-02-25 DIAGNOSIS — K75.81 NASH (NONALCOHOLIC STEATOHEPATITIS): ICD-10-CM

## 2025-02-25 DIAGNOSIS — M81.0 AGE-RELATED OSTEOPOROSIS WITHOUT CURRENT PATHOLOGICAL FRACTURE: Primary | ICD-10-CM

## 2025-02-25 ASSESSMENT — PAIN SCALES - GENERAL: PAINLEVEL_OUTOF10: NO PAIN (0)

## 2025-02-25 NOTE — PATIENT INSTRUCTIONS
To help keep bones strong, in addition to routine cardio exercise and weight based exercise, you should get 1000 units Vitamin D daily and 1200 mg of Calcium from diet AND supplement COMBINED. Calcium should be taken in two divided doses, max dose at one time is 600mg. These can be found in women's multivitamins or as an additional supplement. Please check the label to ensure that your vitamin has enough of each and also check what you are eating to determine calcium supplement needed.    Foods and drinks with calcium  Food Calcium in milligrams   Milk (skim, 2%, or whole; 8 oz [240 mL]) 300   Yogurt (6 oz [168 g]) 250   Orange juice (with calcium; 8 oz [240 mL]) 300   Tofu with calcium (0.5 cup [113 g]) 435   Cheese (1 oz [28 g]) 195 to 335 (hard cheese = higher calcium)   Cottage cheese (0.5 cup [113 g]) 130   Ice cream or frozen yogurt (0.5 cup [113 g]) 100   Fortified non-dairy milks (soy, oat, almond; 8 oz [240 mL]) 300 to 450   Beans (0.5 cup cooked [113 g]) 60 to 80   Dark, leafy green vegetables (0.5 cup cooked [113 g]) 50 to 135   Almonds (24 whole) 70   Orange (1 medium) 60

## 2025-02-25 NOTE — PROGRESS NOTES
Assessment & Plan     Age-related osteoporosis without current pathological fracture  Risks of lower BMI, early menopause, family history.   Predominantly hip osteoporosis.   Recommended reclast with poor absorption possible and gerd.  Patient would prefer calcium, vit D and exercise.   Will repeat dexa 18months. Summer 2026.    REDDING (nonalcoholic steatohepatitis)  Overweight (BMI 25.0-29.9)  Mixed hyperlipidemia  Prediabetes  BMI 26.8.  Known fatty liver and hyperlipidemia.  Struggling with weight loss.  Interested in a GLP-1 agonist.  No contraindications (history of Men2 syndrome, medullary thyroid cancer, or pancreatitis).  Discussed starting dose and titration of medication.  Injections are weekly and increased at monthly intervals.    Discussed the prescriptions can be expensive if insurance does not cover. If not covered, will need to follow up to discuss other options.  For her, should discuss alternative medication with Russell Arnold unless she wants to pay out-of-pocket.  Discussed benefits including weight loss, slowed gastric emptying to feel full longer, lower blood sugar.  Discussed side effects of heartburn, nausea, indigestion, bloating, diarrhea, pancreatitis, and constipation, and possible risk of thyroid cancer in the future, although this has not been proven or noted on recent 5 to 10-year studies in humans.  Reviewed the best benefit comes from combining with exercise changes and a healthy diet.  Will plan to reassess in 1 month if able to obtain medication.  Follow-up sooner if needed.  - tirzepatide-Weight Management (ZEPBOUND) 2.5 MG/0.5ML prefilled pen; Inject 0.5 mLs (2.5 mg) subcutaneously every 7 days.        The longitudinal plan of care for the diagnosis(es)/condition(s) as documented were addressed during this visit. Due to the added complexity in care, I will continue to support Gisel in the subsequent management and with ongoing continuity of care.      34 minutes spent by me on the  date of the encounter doing chart review, history and exam, documentation and further activities per the note    Jordan Batres is a 59 year old, presenting for the following health issues:  Osteoporosis (Follow up Dexa scan results)        2/25/2025    11:17 AM   Additional Questions   Roomed by Martha briggs   Accompanied by self         2/25/2025   Declines Weight   Did patient decline having their weight taken? Yes         2/25/2025    11:17 AM   Patient Reported Additional Medications   Patient reports taking the following new medications none     History of Present Illness       Reason for visit:  Upper right quadrant pain/fullness  Symptom onset:  1-2 weeks ago  Symptoms include:  Dull pain under the right rib cage, it feels like I have something in there  Symptom intensity:  Moderate  Symptom progression:  Staying the same  Had these symptoms before:  No  What makes it worse:  No  What makes it better:  No   She is taking medications regularly.     Patient here today to discuss DEXA scan results.   Narrative & Impression   Procedure:  DX AXIAL HIPS/SPINE     History: Female, age 59 years; Asymptomatic menopausal state     Comparison:  No relevant prior imaging.                                                                      IMPRESSION: Osteoporosis.     Hip:  0.541 g/cm2.   T score: -2.8. Baseline examination.     Lumbar spine:  0.855 g/cm2.   T score: -1.7. Baseline examination.        Menopause age: 39-40 - no hormone replacement (still had one ovary)  History of fractures: Right shoulder (around 30 years ago)  Calcium intake: No supplements, mostly eats yogurt and/or cheese  Vitamin D intake: 2 tablets OTC (pt does not remember strength)  Family history of osteoporosis: On mother's side  Exercise: Walking and stationary bike with incline, home weight workouts  Alcohol: No  Tobacco use: No  BMI: 26.79  PPI use: Sometimes will take Omeprazole, Famotidine more often  Long term prednisone use:  Finished last Friday - was a month   Fevers, chills, night sweats, unintentional weight loss, bone pain: Some night sweats at times    Contraindications: No esophageal disorders (achalasia, esophageal stricture, esophageal varices, Mccullough's esophagus) or inability to stay upright for 30 min after taking dose. No history of bariatric surgery with anastomoses (Jerrell-en-Y gastric bypass). DOES GET A LOT OF HEARTBURN AND PLANNING TO START GLP. ALSO POSSIBLE CELIAC DISEASE.     Risks discussed including atypical femur fractures, GI irritation (esophagitis, perforation), hypocalcemia, osteonecrosis of jaw. Can get musculoskeletal pain or flu like illness.     Estimated Creatinine Clearance: 70.5 mL/min (based on SCr of 0.88 mg/dL).    Liver.   Had liver biopsy end of January. Saw anusha sheldon but results weren't back yet.   Results showed fatty liver. No autoimmune disease.   Done with prednisone.   NOT eating gluten   Was on Black Cohash - stopped     Watching what she eat, doesn't eat a lot.   History of ozempic   No history of pancreatitis, medullary thyroid cancer, or Men 2 syndrome    The ASCVD Risk score (Marie ROMANO, et al., 2019) failed to calculate for the following reasons:    Risk score cannot be calculated because patient has a medical history suggesting prior/existing ASCVD      Final Dx    Liver, needle biopsies:   - Fatty liver, consistent with nonalcoholic fatty liver disease.  Steatohepatitis, see comment.      -Grade of activity:       BECCA score 3/8.    Steatosis:                                     1/3 (5 - 33%).  Lobular inflammation:                  2/3 (2 - 4 foci/20x field).  Ballooning degeneration:             0/2 (none).                                         -Stage of fibrosis:       2/4 (perivenular + portal fibrosis).      Alkaline Phosphatase  40 - 150 IU/L 52   Bilirubin, Total  0.2 - 1.2 mg/dL 0.3   Bilirubin, Direct  0.0 - 0.5 mg/dL <0.2   Bilirubin, Indirect    Comment: Indirect Bilirubin  "cannot be calculated because Total Bilirubin and/or Direct Bilirubin are outside linearity.  Please contact lab with questions on the linear range.   Albumin  3.5 - 5.0 g/dL 3.8   Protein, Total  6.0 - 8.0 g/dL 7   Aspartate Aminotransferase  10 - 40 IU/L 51 High    Alanine Aminotransferase  6 - 31 IU/L 104 High             Objective    /76 (BP Location: Right arm, Patient Position: Sitting, Cuff Size: Adult Regular)   Pulse 64   Temp 96.9  F (36.1  C) (Tympanic)   Resp 16   Ht 1.676 m (5' 6\")   Wt 75.3 kg (166 lb)   SpO2 99%   BMI 26.79 kg/m    Body mass index is 26.79 kg/m .    Physical Exam  Constitutional:       General: She is not in acute distress.     Appearance: Normal appearance. She is not ill-appearing.   Pulmonary:      Effort: Pulmonary effort is normal.   Skin:     General: Skin is warm and dry.   Neurological:      General: No focal deficit present.      Mental Status: She is alert.   Psychiatric:         Mood and Affect: Mood normal.         Behavior: Behavior normal.         Thought Content: Thought content normal.         Judgment: Judgment normal.                    Signed Electronically by: Edwina Childress MD    "

## 2025-03-11 ENCOUNTER — MYC MEDICAL ADVICE (OUTPATIENT)
Dept: FAMILY MEDICINE | Facility: OTHER | Age: 60
End: 2025-03-11

## 2025-03-12 ENCOUNTER — NURSE TRIAGE (OUTPATIENT)
Dept: CARE COORDINATION | Facility: OTHER | Age: 60
End: 2025-03-12

## 2025-03-12 NOTE — TELEPHONE ENCOUNTER
"Protocol advises patient to be seen within 24 hours. Patient is scheduled tomorrow with PCP.  Next 5 appointments (look out 90 days)      Mar 13, 2025 4:00 PM  (Arrive by 3:45 PM)  Provider Visit with Edwina Childress MD  [facility] [place] ([facility] [place] ) [address]  [place] [address]  [phone]            Reason for Disposition   [1] MODERATE weakness (i.e., interferes with work, school, normal activities) AND [2] persists > 3 days    Additional Information   Negative: SEVERE difficulty breathing (e.g., struggling for each breath, speaks in single words)   Negative: Shock suspected (e.g., cold/pale/clammy skin, too weak to stand, low BP, rapid pulse)   Negative: Difficult to awaken or acting confused (e.g., disoriented, slurred speech)   Negative: [1] Fainted > 15 minutes ago AND [2] still feels too weak or dizzy to stand   Negative: [1] SEVERE weakness (i.e., unable to walk or barely able to walk, requires support) AND [2] new-onset or worsening   Negative: Sounds like a life-threatening emergency to the triager   Negative: Weakness of the face, arm or leg on one side of the body   Negative: [1] Diabetes mellitus AND [2] weakness from low blood sugar (i.e., < 60 mg/dl or 3.5 mmol/l)   Negative: Heat exhaustion suspected (i.e., dehydration from heat exposure)   Negative: Chest pain   Negative: Vomiting is main symptom   Negative: Diarrhea is main symptom   Negative: Difficulty breathing   Negative: Heart beating < 50 beats per minute OR > 140 beats per minute   Negative: Extra heartbeats, irregular heart beating, or heart is beating very fast  (i.e., \"palpitations\")   Negative: Follows large amount of bleeding (e.g., from vomiting, rectum, vagina  (Exception: Small brief weakness from sight of a small amount blood.)   Negative: Black or tarry bowel movements   Negative: [1] Drinking very little AND [2] dehydration suspected (e.g., no urine > 12 hours, very dry mouth, very " "lightheaded)   Negative: Patient sounds very sick or weak to the triager   Negative: [1] MODERATE weakness (i.e., interferes with work, school, normal activities) AND [2] cause unknown  (Exceptions: Weakness from acute minor illness or poor fluid intake; weakness is chronic and not worse.)   Negative: [1] MODERATE weakness or fatigue AND [2] from poor fluid intake AND [3] no improvement after 2 hours of rest and fluids   Negative: [1] Fever > 103 F (39.4 C) AND [2] not able to get the fever down using Fever Care Advice   Negative: [1] Fever > 101 F (38.3 C) AND [2] age > 60 years   Negative: [1] Fever > 100.0 F (37.8 C) AND [2] bedridden (e.g., CVA, chronic illness, recovering from surgery)   Negative: [1] Fever > 100.0 F (37.8 C) AND [2] diabetes mellitus or weak immune system (e.g., HIV positive, cancer chemo, splenectomy, organ transplant, chronic steroids)   Negative: Pale skin (pallor)    Answer Assessment - Initial Assessment Questions  1. DESCRIPTION: \"Describe how you are feeling.\"      Very fatigued, rundown and tired  2. SEVERITY: \"How bad is it?\"  \"Can you stand and walk?\"    - MILD (0-3): Feels weak or tired, but does not interfere with work, school or normal activities.    - MODERATE (4-7): Able to stand and walk; weakness interferes with work, school, or normal activities.    - SEVERE (8-10): Unable to stand or walk; unable to do usual activities.      Mild-moderate  3. ONSET: \"When did these symptoms begin?\" (e.g., hours, days, weeks, months)      A week ago  4. CAUSE: \"What do you think is causing the weakness or fatigue?\" (e.g., not drinking enough fluids, medical problem, trouble sleeping)      unsure  5. NEW MEDICINES:  \"Have you started on any new medicines recently?\" (e.g., opioid pain medicines, benzodiazepines, muscle relaxants, antidepressants, antihistamines, neuroleptics, beta blockers)      No   6. OTHER SYMPTOMS: \"Do you have any other symptoms?\" (e.g., chest pain, fever, cough, SOB, " "vomiting, diarrhea, bleeding, other areas of pain)      Nose bleeds  7. PREGNANCY: \"Is there any chance you are pregnant?\" \"When was your last menstrual period?\"      No    Answer Assessment - Initial Assessment Questions  1. AMOUNT OF BLEEDING: \"How bad is the bleeding?\" \"How much blood was lost?\" \"Has the bleeding stopped?\"    - MILD: needed a couple tissues    - MODERATE: needed many tissues    - SEVERE: large blood clots, soaked many tissues, lasted more than 30 minutes       moderate  2. ONSET: \"When did the nosebleed start?\"       Between 5 AM and 6 AM  3. FREQUENCY: \"How many nosebleeds have you had in the last 24 hours?\"       1  4. RECURRENT SYMPTOMS: \"Have there been other recent nosebleeds?\" If Yes, ask: \"How long did it take you to stop the bleeding?\" \"What worked best?\"       Yes. 1-2 within this last week  5. CAUSE: \"What do you think caused this nosebleed?\"      unsure  6. LOCAL FACTORS: \"Do you have any cold symptoms?\", \"Have you been rubbing or picking at your nose?\"      No   7. SYSTEMIC FACTORS: \"Do you have high blood pressure or any bleeding problems?\"      no  8. BLOOD THINNERS: \"Do you take any blood thinners?\" (e.g., aspirin, clopidogrel / Plavix, coumadin, heparin). Notes: Other strong blood thinners include: Arixtra (fondaparinux), Eliquis (apixaban), Pradaxa (dabigatran), and Xarelto (rivaroxaban).      No   9. OTHER SYMPTOMS: \"Do you have any other symptoms?\" (e.g., lightheadedness)      Fatigue   10. PREGNANCY: \"Is there any chance you are pregnant?\" \"When was your last menstrual period?\"        No    Protocols used: Weakness (Generalized) and Fatigue-A-AH, Nosebleed-A-AH    "

## 2025-03-12 NOTE — PROGRESS NOTES
Assessment & Plan     Fatigue, unspecified type  May be multifactorial.  Did wean off prednisone, start keto diet, has been having fluctuations with liver enzymes.  May also have a viral infection, a lot of viruses in the community and she is exposed to grandchildren.  May also be related to allergies, does have boggy nasal mucosa and postnasal drainage.  Reassuring CBC is stable.  Will get inflammatory labs, recheck thyroid, and obtain vitamin labs.  Has been did have tickborne illness, will check tick testing but lower likelihood.  Did not update LFTs, they were just done 3 days ago.  Continues to follow closely with Russell Arnold at [facility].  Reasonable to continue with vitamin supplementation, ensure hydration, consider allergy medication and Flonase.  Closely monitor symptoms.  Will be in touch regarding labs.  - CBC with Platelets & Differential  - Erythrocyte sedimentation rate auto  - CRP inflammation  - TSH with free T4 reflex  - Vitamin B12  - Vitamin D Deficiency  - Lyme Disease Total Antibodies with Reflex to Confirmation  - Tick-Borne Disease Panel (Non-Lyme) by PCR  - Basic metabolic panel    Transaminitis  Following with GI.  Slightly worse but have been overall stable on recent labs.  Thought related to Amalia-Barr virus and fatty liver.  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    Epistaxis  Long history of this.  Was using a humidifier until a few weeks ago and was not having nosebleeds.  Stopped 1 week ago.  Did have significant nosebleed on Tuesday.  Likely related to dry ear.  Do not appreciate any vessel on my examination of her nasal mucosa or nostrils.  Discussed hydration of the nose, Vaseline, nasal saline spray and restarting her humidifier.  Will recheck CBC and INR.  If persistent and not improving, should see an ENT.  Rarely Amalia-Hernández can be associated with nasopharyngeal carcinomas but less likely at this time.  - CBC with Platelets & Differential; Future  - INR;  "Future  - CBC with Platelets & Differential  - INR    Acquired hypothyroidism  Will recheck TSH.  Did have to adjust dose due to low TSH a few months ago.    Neck fullness  Questionable on examination.  May be shotty lymphadenopathy.  Do not appreciate any thyromegaly.  Will plan to recheck this and follow-up on her fatigue in 2 weeks.  If remains questionable or worsening, will need CT neck.      The longitudinal plan of care for the diagnosis(es)/condition(s) as documented were addressed during this visit. Due to the added complexity in care, I will continue to support Gisel in the subsequent management and with ongoing continuity of care.      31 minutes spent by me on the date of the encounter doing chart review, history and exam, documentation and further activities per the note. 24 min spent directly in exam room.     Jordan Batres is a 59 year old, presenting for the following health issues:  Fatigue and Epistaxis        3/13/2025     3:43 PM   Additional Questions   Roomed by Edyta OCONNOR     History of Present Illness       Reason for visit:  Follow up on liver/Amalia Barr issues - feeling very tired, fatigued, wondering if amalia barr is flaring    She eats 2-3 servings of fruits and vegetables daily.She consumes 0 sweetened beverage(s) daily.She exercises with enough effort to increase her heart rate 20 to 29 minutes per day.  She exercises with enough effort to increase her heart rate 4 days per week.   She is taking medications regularly.        Concern - fatigue   Onset: last week  Description: liver labs increase  Intensity: moderate  Progression of Symptoms:  intermittent  Accompanying Signs & Symptoms: naps during the day and \"wiped out\", dry mouth  Previous history of similar problem: yes, history of mono  Precipitating factors:        Worsened by: increased work volume  Alleviating factors:        Improved by: resting  Therapies tried and outcome: None    Very wiped out. Feeling like she could " nap all the time.   Started 1-2 weeks ago   Didn't feel like this when mono dx last time.   Takes multivitamin daily   Did start a keto type diet when the fatigue started  No fevers or congestion. Throat is dry. A little achy. Neck and shoulders. No hip pain. Able to move arms around.   Tick exposures -  had two tick diseases  No one sick at home   Throat feels like sinus drainage    Was on prednisone for liver - stopped 2/21/25  Labs 3/10/25 normal CBC, stable lft, INR, Immunoglobulin   TSH within normal limits 1/20/25     Concern - nose bleeds  Onset: two weeks  Description: spontaneous nose bleed, middle of nights, left side more than right  Intensity: moderate  Progression of Symptoms:  worsening  Accompanying Signs & Symptoms: none  Previous history of similar problem: no  Precipitating factors:        Worsened by: dry air  Alleviating factors:        Improved by: humidifier  Therapies tried and outcome: None    Nose bleeds here and there. ?dry.   Usually occur in January for her - got better with humidifier.   Last occurred Tuesday night.   Put humidifier away a week ago        Objective    /64 (BP Location: Right arm, Patient Position: Sitting, Cuff Size: Adult Regular)   Pulse 73   Temp 97.7  F (36.5  C) (Tympanic)   Wt 73.9 kg (163 lb)   SpO2 98%   BMI 26.31 kg/m    Body mass index is 26.31 kg/m .    Physical Exam  Constitutional:       General: She is not in acute distress.     Appearance: Normal appearance. She is not ill-appearing.   HENT:      Head:      Jaw: There is normal jaw occlusion.      Right Ear: Tympanic membrane and external ear normal.      Left Ear: Tympanic membrane and external ear normal.      Nose: Mucosal edema present. No congestion or rhinorrhea.      Mouth/Throat:      Mouth: Mucous membranes are moist.      Pharynx: Oropharynx is clear. Uvula midline. No oropharyngeal exudate or posterior oropharyngeal erythema.      Tonsils: No tonsillar exudate or tonsillar  abscesses.   Eyes:      General: No scleral icterus.        Right eye: No discharge.         Left eye: No discharge.      Extraocular Movements: Extraocular movements intact.      Conjunctiva/sclera: Conjunctivae normal.      Pupils: Pupils are equal, round, and reactive to light.   Neck:      Thyroid: No thyroid mass or thyromegaly.   Cardiovascular:      Rate and Rhythm: Normal rate and regular rhythm.      Heart sounds: No murmur heard.  Pulmonary:      Effort: Pulmonary effort is normal.      Breath sounds: Normal breath sounds. No wheezing, rhonchi or rales.   Musculoskeletal:      Cervical back: Normal range of motion and neck supple. No tenderness.   Lymphadenopathy:      Cervical: Cervical adenopathy (shotty fullness bilaterally) present.   Skin:     General: Skin is warm and dry.      Capillary Refill: Capillary refill takes less than 2 seconds.   Neurological:      General: No focal deficit present.      Mental Status: She is alert and oriented to person, place, and time.   Psychiatric:         Mood and Affect: Mood normal.         Behavior: Behavior normal.          Results for orders placed or performed in visit on 03/13/25   CBC with platelets and differential     Status: None   Result Value Ref Range    WBC Count 5.7 4.0 - 11.0 10e3/uL    RBC Count 5.15 3.80 - 5.20 10e6/uL    Hemoglobin 14.2 11.7 - 15.7 g/dL    Hematocrit 43.4 35.0 - 47.0 %    MCV 84 78 - 100 fL    MCH 27.6 26.5 - 33.0 pg    MCHC 32.7 31.5 - 36.5 g/dL    RDW 13.3 10.0 - 15.0 %    Platelet Count 310 150 - 450 10e3/uL    % Neutrophils 63 %    % Lymphocytes 26 %    % Monocytes 9 %    % Eosinophils 1 %    % Basophils 1 %    % Immature Granulocytes 0 %    NRBCs per 100 WBC 0 <1 /100    Absolute Neutrophils 3.6 1.6 - 8.3 10e3/uL    Absolute Lymphocytes 1.5 0.8 - 5.3 10e3/uL    Absolute Monocytes 0.5 0.0 - 1.3 10e3/uL    Absolute Eosinophils 0.1 0.0 - 0.7 10e3/uL    Absolute Basophils 0.0 0.0 - 0.2 10e3/uL    Absolute Immature Granulocytes  0.0 <=0.4 10e3/uL    Absolute NRBCs 0.0 10e3/uL   CBC with Platelets & Differential     Status: None    Narrative    The following orders were created for panel order CBC with Platelets & Differential.  Procedure                               Abnormality         Status                     ---------                               -----------         ------                     CBC with platelets and ...[[id number]]                      Final result                 Please view results for these tests on the individual orders.               Signed Electronically by: Edwina Childress MD

## 2025-03-13 ENCOUNTER — OFFICE VISIT (OUTPATIENT)
Dept: FAMILY MEDICINE | Facility: OTHER | Age: 60
End: 2025-03-13
Attending: STUDENT IN AN ORGANIZED HEALTH CARE EDUCATION/TRAINING PROGRAM
Payer: COMMERCIAL

## 2025-03-13 VITALS
WEIGHT: 163 LBS | TEMPERATURE: 97.7 F | OXYGEN SATURATION: 98 % | HEART RATE: 73 BPM | SYSTOLIC BLOOD PRESSURE: 112 MMHG | DIASTOLIC BLOOD PRESSURE: 64 MMHG | BODY MASS INDEX: 26.31 KG/M2

## 2025-03-13 DIAGNOSIS — E03.9 ACQUIRED HYPOTHYROIDISM: Chronic | ICD-10-CM

## 2025-03-13 DIAGNOSIS — R04.0 EPISTAXIS: ICD-10-CM

## 2025-03-13 DIAGNOSIS — R74.01 TRANSAMINITIS: ICD-10-CM

## 2025-03-13 DIAGNOSIS — B27.90 EPSTEIN BARR INFECTION: ICD-10-CM

## 2025-03-13 DIAGNOSIS — R53.83 FATIGUE, UNSPECIFIED TYPE: Primary | ICD-10-CM

## 2025-03-13 DIAGNOSIS — R22.1 NECK FULLNESS: ICD-10-CM

## 2025-03-13 LAB
ANION GAP SERPL CALCULATED.3IONS-SCNC: 10 MMOL/L (ref 7–15)
BASOPHILS # BLD AUTO: 0 10E3/UL (ref 0–0.2)
BASOPHILS NFR BLD AUTO: 1 %
BUN SERPL-MCNC: 22.3 MG/DL (ref 8–23)
CALCIUM SERPL-MCNC: 9.8 MG/DL (ref 8.8–10.4)
CHLORIDE SERPL-SCNC: 102 MMOL/L (ref 98–107)
CREAT SERPL-MCNC: 0.78 MG/DL (ref 0.51–0.95)
CRP SERPL-MCNC: <3 MG/L
EGFRCR SERPLBLD CKD-EPI 2021: 86 ML/MIN/1.73M2
EOSINOPHIL # BLD AUTO: 0.1 10E3/UL (ref 0–0.7)
EOSINOPHIL NFR BLD AUTO: 1 %
ERYTHROCYTE [DISTWIDTH] IN BLOOD BY AUTOMATED COUNT: 13.3 % (ref 10–15)
ERYTHROCYTE [SEDIMENTATION RATE] IN BLOOD BY WESTERGREN METHOD: 11 MM/HR (ref 0–30)
GLUCOSE SERPL-MCNC: 101 MG/DL (ref 70–99)
HCO3 SERPL-SCNC: 26 MMOL/L (ref 22–29)
HCT VFR BLD AUTO: 43.4 % (ref 35–47)
HGB BLD-MCNC: 14.2 G/DL (ref 11.7–15.7)
IMM GRANULOCYTES # BLD: 0 10E3/UL
IMM GRANULOCYTES NFR BLD: 0 %
INR PPP: 0.98 (ref 0.85–1.15)
LYMPHOCYTES # BLD AUTO: 1.5 10E3/UL (ref 0.8–5.3)
LYMPHOCYTES NFR BLD AUTO: 26 %
MCH RBC QN AUTO: 27.6 PG (ref 26.5–33)
MCHC RBC AUTO-ENTMCNC: 32.7 G/DL (ref 31.5–36.5)
MCV RBC AUTO: 84 FL (ref 78–100)
MONOCYTES # BLD AUTO: 0.5 10E3/UL (ref 0–1.3)
MONOCYTES NFR BLD AUTO: 9 %
NEUTROPHILS # BLD AUTO: 3.6 10E3/UL (ref 1.6–8.3)
NEUTROPHILS NFR BLD AUTO: 63 %
NRBC # BLD AUTO: 0 10E3/UL
NRBC BLD AUTO-RTO: 0 /100
PLATELET # BLD AUTO: 310 10E3/UL (ref 150–450)
POTASSIUM SERPL-SCNC: 4 MMOL/L (ref 3.4–5.3)
RBC # BLD AUTO: 5.15 10E6/UL (ref 3.8–5.2)
SODIUM SERPL-SCNC: 138 MMOL/L (ref 135–145)
TSH SERPL DL<=0.005 MIU/L-ACNC: 0.68 UIU/ML (ref 0.3–4.2)
WBC # BLD AUTO: 5.7 10E3/UL (ref 4–11)

## 2025-03-13 NOTE — PATIENT INSTRUCTIONS
Epistaxis (nose bleed)   Daily Nasal Moisture Instructions  Keep your nose moist  Restart humidifier.   Use ocean nasal spray, Ayr nasal saline or any other over the counter nasal saline at least 4-5 times a day for 2 weeks.  Use Ayr gel twice a day and at bedtime for 2 weeks.  If no further bleeding cut back to twice a day saline use and twice a day ayr gel use  If you have any severe allergies take a daily antihistamine (claritin, allergra, zyrtec or similar)   No heavy lifting over 10 pounds, no bending or straining for as long as possible.  Preferably for 2 weeks following a heavy bleed.     If nose bleeds worsening - will send you to ENT

## 2025-03-15 LAB
A PHAGOCYTOPH DNA BLD QL NAA+PROBE: NOT DETECTED
B BURGDOR IGG+IGM SER QL: 0.05
BABESIA DNA BLD QL NAA+PROBE: NOT DETECTED
EHRLICHIA DNA SPEC QL NAA+PROBE: NOT DETECTED
VIT B12 SERPL-MCNC: 1857 PG/ML (ref 232–1245)

## 2025-03-17 ENCOUNTER — TELEPHONE (OUTPATIENT)
Dept: FAMILY MEDICINE | Facility: OTHER | Age: 60
End: 2025-03-17

## 2025-03-17 NOTE — TELEPHONE ENCOUNTER
Received PA request via messages for tirzepatide-Weight Management (ZEPBOUND) 2.5 MG/0.5ML prefilled pen. Submitted on [facility], APPROVED Dates 02/15/2025 - 11/12/2025  The hold up was [facility] put in for a PA 2/25

## 2025-03-27 ENCOUNTER — OFFICE VISIT (OUTPATIENT)
Dept: FAMILY MEDICINE | Facility: OTHER | Age: 60
End: 2025-03-27
Attending: STUDENT IN AN ORGANIZED HEALTH CARE EDUCATION/TRAINING PROGRAM
Payer: COMMERCIAL

## 2025-03-27 VITALS
WEIGHT: 160 LBS | TEMPERATURE: 96.9 F | HEART RATE: 68 BPM | HEIGHT: 66 IN | OXYGEN SATURATION: 98 % | BODY MASS INDEX: 25.71 KG/M2 | DIASTOLIC BLOOD PRESSURE: 68 MMHG | RESPIRATION RATE: 16 BRPM | SYSTOLIC BLOOD PRESSURE: 124 MMHG

## 2025-03-27 DIAGNOSIS — R53.83 FATIGUE, UNSPECIFIED TYPE: Primary | ICD-10-CM

## 2025-03-27 DIAGNOSIS — R22.1 NECK FULLNESS: ICD-10-CM

## 2025-03-27 ASSESSMENT — PAIN SCALES - GENERAL: PAINLEVEL_OUTOF10: NO PAIN (0)

## 2025-03-27 ASSESSMENT — ENCOUNTER SYMPTOMS: FATIGUE: 1

## 2025-03-27 NOTE — PROGRESS NOTES
"  Assessment & Plan     Fatigue, unspecified type  Improving - feeling better over the last week.   ?viral ilness vs keto flu??  Labs normal last visit   Started zepbound Friday   Monitor   Follow up if worsening    Neck fullness  Resolved. Very normal exam today.         The longitudinal plan of care for the diagnosis(es)/condition(s) as documented were addressed during this visit. Due to the added complexity in care, I will continue to support Gisel in the subsequent management and with ongoing continuity of care.    Subjective   Gisel is a 59 year old, presenting for the following health issues:  Fatigue and Neck Problem        3/27/2025     1:53 PM   Additional Questions   Roomed by Martha PURVIS   Accompanied by Self         3/27/2025     1:53 PM   Patient Reported Additional Medications   Patient reports taking the following new medications Zepbound     History of Present Illness       Reason for visit:  Follow up on liver issues and illness from earlier in March    She eats 2-3 servings of fruits and vegetables daily.She consumes 0 sweetened beverage(s) daily.She exercises with enough effort to increase her heart rate 20 to 29 minutes per day.  She exercises with enough effort to increase her heart rate 4 days per week.   She is taking medications regularly.        Concern - Follow up neck fullness/Fatigue  Onset: Feeling tired for about a month  Description: Neck felt \"full\" at last check up  Intensity: mild  Progression of Symptoms:  improving since last week  Accompanying Signs & Symptoms: None  Previous history of similar problem: No  Precipitating factors:        Worsened by: n/a  Alleviating factors:        Improved by: n/a  Therapies tried and outcome: None. Labs done could not explain the cause of Fatigue    Fatigue is improving.   Started zepbound on Friday.   Started to improve from Wednesday, slowly each day  Hasn't noticed neck fullness   No trouble swallowing     Exercising 20min 3x/week " "  Walking more   Watching food         Objective    /68 (BP Location: Right arm, Patient Position: Sitting, Cuff Size: Adult Regular)   Pulse 68   Temp 96.9  F (36.1  C) (Tympanic)   Resp 16   Ht 1.676 m (5' 6\")   Wt 72.6 kg (160 lb)   SpO2 98%   BMI 25.82 kg/m    Body mass index is 25.82 kg/m .    Physical Exam  Constitutional:       General: She is not in acute distress.     Appearance: Normal appearance. She is not ill-appearing.   Neck:      Thyroid: No thyroid mass, thyromegaly or thyroid tenderness.      Comments: No longer neck fullness   Musculoskeletal:      Cervical back: Neck supple.   Lymphadenopathy:      Cervical: No cervical adenopathy.   Neurological:      Mental Status: She is alert.                    Signed Electronically by: Edwina Childress MD  {Email feedback regarding this note to [email or web address]   :[id number]}  "

## 2025-03-28 ENCOUNTER — TRANSFERRED RECORDS (OUTPATIENT)
Dept: HEALTH INFORMATION MANAGEMENT | Facility: CLINIC | Age: 60
End: 2025-03-28

## 2025-04-07 ENCOUNTER — TRANSFERRED RECORDS (OUTPATIENT)
Dept: HEALTH INFORMATION MANAGEMENT | Facility: CLINIC | Age: 60
End: 2025-04-07

## 2025-04-09 ENCOUNTER — MYC MEDICAL ADVICE (OUTPATIENT)
Dept: FAMILY MEDICINE | Facility: OTHER | Age: 60
End: 2025-04-09

## 2025-04-09 DIAGNOSIS — E66.3 OVERWEIGHT (BMI 25.0-29.9): ICD-10-CM

## 2025-04-09 DIAGNOSIS — K75.81 NASH (NONALCOHOLIC STEATOHEPATITIS): Primary | ICD-10-CM

## 2025-04-09 DIAGNOSIS — R73.03 PREDIABETES: ICD-10-CM

## 2025-04-09 DIAGNOSIS — E78.2 MIXED HYPERLIPIDEMIA: ICD-10-CM

## 2025-04-09 NOTE — TELEPHONE ENCOUNTER
Pt called and is okay with what every PCP decides on for the dosing.      Pt has had no negative side effects to the Zepbound 2.5 mg dose.    Would like any prescriptions to got to [facility] in [facility].    Pended up both doses of Zepbound the 2.5 mg and the 5.0 mg  Please review and sign the dose you prefer.

## 2025-05-08 ENCOUNTER — MYC MEDICAL ADVICE (OUTPATIENT)
Dept: FAMILY MEDICINE | Facility: OTHER | Age: 60
End: 2025-05-08

## 2025-05-08 DIAGNOSIS — E66.3 OVERWEIGHT (BMI 25.0-29.9): ICD-10-CM

## 2025-05-08 DIAGNOSIS — K75.81 NASH (NONALCOHOLIC STEATOHEPATITIS): ICD-10-CM

## 2025-05-08 DIAGNOSIS — R73.03 PREDIABETES: ICD-10-CM

## 2025-05-08 DIAGNOSIS — E78.2 MIXED HYPERLIPIDEMIA: ICD-10-CM

## 2025-05-12 ENCOUNTER — TRANSFERRED RECORDS (OUTPATIENT)
Dept: HEALTH INFORMATION MANAGEMENT | Facility: CLINIC | Age: 60
End: 2025-05-12

## 2025-06-02 ENCOUNTER — MYC MEDICAL ADVICE (OUTPATIENT)
Dept: FAMILY MEDICINE | Facility: OTHER | Age: 60
End: 2025-06-02

## 2025-06-02 DIAGNOSIS — R73.03 PREDIABETES: ICD-10-CM

## 2025-06-02 DIAGNOSIS — E78.2 MIXED HYPERLIPIDEMIA: ICD-10-CM

## 2025-06-02 DIAGNOSIS — K75.81 NASH (NONALCOHOLIC STEATOHEPATITIS): ICD-10-CM

## 2025-06-02 DIAGNOSIS — E66.3 OVERWEIGHT (BMI 25.0-29.9): ICD-10-CM

## 2025-06-03 ENCOUNTER — TRANSFERRED RECORDS (OUTPATIENT)
Dept: HEALTH INFORMATION MANAGEMENT | Facility: CLINIC | Age: 60
End: 2025-06-03

## 2025-06-03 NOTE — TELEPHONE ENCOUNTER
Disp Refills Start End FATIMAH   tirzepatide-Weight Management (ZEPBOUND) 5 MG/0.5ML prefilled pen 2 mL 0 5/8/2025 -- No     Last Office Visit: 03/27/2025  Future Office visit:    Next 5 appointments (look out 90 days)      Jul 10, 2025 3:00 PM  (Arrive by 2:45 PM)  Provider Visit with Edwina Childress MD  [facility] [place] ([facility] [place] ) [address]  [place] [address]  [phone]             Routing refill request to provider for review/approval because:

## 2025-06-11 ENCOUNTER — PATIENT OUTREACH (OUTPATIENT)
Dept: CARE COORDINATION | Facility: CLINIC | Age: 60
End: 2025-06-11

## 2025-06-29 ENCOUNTER — MYC MEDICAL ADVICE (OUTPATIENT)
Dept: FAMILY MEDICINE | Facility: OTHER | Age: 60
End: 2025-06-29

## 2025-06-29 DIAGNOSIS — E66.3 OVERWEIGHT (BMI 25.0-29.9): ICD-10-CM

## 2025-06-29 DIAGNOSIS — K75.81 NASH (NONALCOHOLIC STEATOHEPATITIS): ICD-10-CM

## 2025-06-29 DIAGNOSIS — R73.03 PREDIABETES: ICD-10-CM

## 2025-06-29 DIAGNOSIS — E78.2 MIXED HYPERLIPIDEMIA: ICD-10-CM

## 2025-07-09 ENCOUNTER — TRANSFERRED RECORDS (OUTPATIENT)
Dept: HEALTH INFORMATION MANAGEMENT | Facility: CLINIC | Age: 60
End: 2025-07-09

## 2025-07-10 ENCOUNTER — OFFICE VISIT (OUTPATIENT)
Dept: FAMILY MEDICINE | Facility: OTHER | Age: 60
End: 2025-07-10
Attending: STUDENT IN AN ORGANIZED HEALTH CARE EDUCATION/TRAINING PROGRAM
Payer: COMMERCIAL

## 2025-07-10 VITALS
OXYGEN SATURATION: 99 % | DIASTOLIC BLOOD PRESSURE: 76 MMHG | BODY MASS INDEX: 22.5 KG/M2 | RESPIRATION RATE: 16 BRPM | HEART RATE: 76 BPM | SYSTOLIC BLOOD PRESSURE: 120 MMHG | WEIGHT: 140 LBS | TEMPERATURE: 98.6 F | HEIGHT: 66 IN

## 2025-07-10 DIAGNOSIS — K75.81 NASH (NONALCOHOLIC STEATOHEPATITIS): ICD-10-CM

## 2025-07-10 DIAGNOSIS — E66.3 OVERWEIGHT (BMI 25.0-29.9): Primary | ICD-10-CM

## 2025-07-10 DIAGNOSIS — R73.03 PREDIABETES: ICD-10-CM

## 2025-07-10 DIAGNOSIS — E78.2 MIXED HYPERLIPIDEMIA: ICD-10-CM

## 2025-07-10 PROBLEM — B27.90 EPSTEIN BARR INFECTION: Status: RESOLVED | Noted: 2025-03-13 | Resolved: 2025-07-10

## 2025-07-10 RX ORDER — METFORMIN HYDROCHLORIDE 500 MG/1
500 TABLET, EXTENDED RELEASE ORAL
Qty: 90 TABLET | Refills: 3 | Status: SHIPPED | OUTPATIENT
Start: 2025-07-10

## 2025-07-10 ASSESSMENT — PAIN SCALES - GENERAL: PAINLEVEL_OUTOF10: NO PAIN (0)

## 2025-07-10 NOTE — PATIENT INSTRUCTIONS
Once goal weight, 130lbs, if still losing weight, need to reduce zepbound to 2.5mg or change dosing to every 10 days.   Reduce metformin to one tablet daily

## 2025-07-10 NOTE — PROGRESS NOTES
Assessment & Plan     Overweight (BMI 25.0-29.9)  REDDING (nonalcoholic steatohepatitis)  Mixed hyperlipidemia  Prediabetes  Doing very well with weight loss - down more than 20lbs, feeling healthy, more energy. Following diet closely, exercising, doing weight based exercises.   Will continue zepbound 5mg. Once goal weight (around 130lb), will either reduce to 2.5mg or have her extending dosing to 10 day intervals.   Plan to reduce metformin to one tablet, down from two tablets.   Consider updating A1c again in 1 month.   Continue to monitor LFTs - they are improving.   Follow up six months   - metFORMIN (GLUCOPHAGE XR) 500 MG 24 hr tablet; Take 1 tablet (500 mg) by mouth daily (with breakfast).        The longitudinal plan of care for the diagnosis(es)/condition(s) as documented were addressed during this visit. Due to the added complexity in care, I will continue to support Gisel in the subsequent management and with ongoing continuity of care.    Jordan Batres is a 60 year old, presenting for the following health issues:  Weight Management        7/10/2025     2:45 PM   Additional Questions   Roomed by Becki FOY   Accompanied by self         7/10/2025     2:45 PM   Patient Reported Additional Medications   Patient reports taking the following new medications none     History of Present Illness       Reason for visit:  Follow up    She eats 2-3 servings of fruits and vegetables daily.She consumes 1 sweetened beverage(s) daily.She exercises with enough effort to increase her heart rate 30 to 60 minutes per day.  She exercises with enough effort to increase her heart rate 4 days per week.   She is taking medications regularly.        WEIGHT FOLLOW UP   Weight loss medication: Zepbound  Date started: 3/21/2025  Starting weight: 163 lb   Current weight: 140 lbs 0 oz  Weight change since last visit: 20 lbs  Total weight change: 23 lbs  Weight goal: 130-135 lb    Benefits of medication: curb hunger, reduces sugar  "cravings, improved overall feeling, improved A1c, weight loss  Side effects of medication: none    Stopped mesalamine. Liver enzymes improving now.   Has lost 20lbs. Does have fatty liver.   5mg Zepbound.   No food cravings around day 7.   Has colonoscopy next week   Still doing metformin twice daily   No diarrhea or stomach upset  Ideally would like to get off metformin      DIET HISTORY  Breakfast: 2 eggs with a little cheese, coffee with half and half  Lunch: protein shake (homemade)  Dinner: chicken stir-peterson with small amount of rice  Snacks: nuts, fruit   Drinks: water  Alcohol: socially, maybe once per month a glass of wine    Exercise: walk with 12 lb weighted vest, hand weights          Objective    /76 (BP Location: Right arm, Patient Position: Sitting, Cuff Size: Adult Regular)   Pulse 76   Temp 98.6  F (37  C) (Tympanic)   Resp 16   Ht 1.676 m (5' 6\")   Wt 63.5 kg (140 lb)   SpO2 99%   BMI 22.60 kg/m    Body mass index is 22.6 kg/m .    Physical Exam  Constitutional:       General: She is not in acute distress.     Appearance: Normal appearance. She is not ill-appearing.   Pulmonary:      Effort: Pulmonary effort is normal.   Skin:     General: Skin is warm and dry.   Neurological:      General: No focal deficit present.      Mental Status: She is alert.   Psychiatric:         Mood and Affect: Mood normal.         Behavior: Behavior normal.         Thought Content: Thought content normal.         Judgment: Judgment normal.                    Signed Electronically by: Edwina Childress MD    "

## 2025-07-30 ENCOUNTER — MYC MEDICAL ADVICE (OUTPATIENT)
Dept: FAMILY MEDICINE | Facility: OTHER | Age: 60
End: 2025-07-30

## 2025-07-30 DIAGNOSIS — R73.03 PREDIABETES: ICD-10-CM

## 2025-07-30 DIAGNOSIS — K75.81 NASH (NONALCOHOLIC STEATOHEPATITIS): ICD-10-CM

## 2025-07-30 DIAGNOSIS — E78.2 MIXED HYPERLIPIDEMIA: ICD-10-CM

## 2025-07-30 DIAGNOSIS — E66.3 OVERWEIGHT (BMI 25.0-29.9): ICD-10-CM

## 2025-07-31 NOTE — TELEPHONE ENCOUNTER
Zepbound       Last Written Prescription Date:  6/30/25  Last Fill Quantity: 2,   # refills: 0  Last Office Visit: 7/10/25  Future Office visit:       Routing refill request to provider for review/approval because:  Drug not on the [facility], [facility] or [facility] refill protocol or controlled substance

## 2025-08-01 DIAGNOSIS — R73.03 PREDIABETES: ICD-10-CM

## 2025-08-04 ENCOUNTER — MYC MEDICAL ADVICE (OUTPATIENT)
Dept: FAMILY MEDICINE | Facility: OTHER | Age: 60
End: 2025-08-04

## 2025-08-04 DIAGNOSIS — R73.03 PREDIABETES: ICD-10-CM

## 2025-08-04 RX ORDER — METFORMIN HYDROCHLORIDE 500 MG/1
500 TABLET, EXTENDED RELEASE ORAL
Qty: 90 TABLET | Refills: 3 | Status: SHIPPED | OUTPATIENT
Start: 2025-08-04

## 2025-08-04 RX ORDER — METFORMIN HYDROCHLORIDE 500 MG/1
500 TABLET, EXTENDED RELEASE ORAL
Qty: 90 TABLET | Refills: 3 | OUTPATIENT
Start: 2025-08-04

## 2025-08-14 ENCOUNTER — TRANSFERRED RECORDS (OUTPATIENT)
Dept: HEALTH INFORMATION MANAGEMENT | Facility: CLINIC | Age: 60
End: 2025-08-14